# Patient Record
Sex: MALE | Race: AMERICAN INDIAN OR ALASKA NATIVE | NOT HISPANIC OR LATINO
[De-identification: names, ages, dates, MRNs, and addresses within clinical notes are randomized per-mention and may not be internally consistent; named-entity substitution may affect disease eponyms.]

---

## 2018-10-18 ENCOUNTER — TRANSCRIPTION ENCOUNTER (OUTPATIENT)
Age: 78
End: 2018-10-18

## 2018-10-18 ENCOUNTER — OUTPATIENT (OUTPATIENT)
Dept: OUTPATIENT SERVICES | Facility: HOSPITAL | Age: 78
LOS: 1 days | Discharge: HOME | End: 2018-10-18
Payer: MEDICARE

## 2018-10-18 PROBLEM — Z00.00 ENCOUNTER FOR PREVENTIVE HEALTH EXAMINATION: Status: ACTIVE | Noted: 2018-10-18

## 2018-10-19 ENCOUNTER — INPATIENT (INPATIENT)
Facility: HOSPITAL | Age: 78
LOS: 5 days | Discharge: HOME | DRG: 841 | End: 2018-10-25
Attending: HOSPITALIST | Admitting: INTERNAL MEDICINE
Payer: COMMERCIAL

## 2018-10-19 VITALS
HEART RATE: 76 BPM | TEMPERATURE: 98 F | RESPIRATION RATE: 17 BRPM | DIASTOLIC BLOOD PRESSURE: 64 MMHG | OXYGEN SATURATION: 97 % | SYSTOLIC BLOOD PRESSURE: 110 MMHG

## 2018-10-19 DIAGNOSIS — E87.6 HYPOKALEMIA: ICD-10-CM

## 2018-10-19 LAB
ALBUMIN SERPL ELPH-MCNC: 2.8 G/DL — LOW (ref 3.3–5)
ALP SERPL-CCNC: 73 U/L — SIGNIFICANT CHANGE UP (ref 40–120)
ALT FLD-CCNC: 14 U/L — SIGNIFICANT CHANGE UP (ref 10–45)
ANION GAP SERPL CALC-SCNC: 14 MMOL/L — SIGNIFICANT CHANGE UP (ref 5–17)
APPEARANCE UR: ABNORMAL
AST SERPL-CCNC: 18 U/L — SIGNIFICANT CHANGE UP (ref 10–40)
BACTERIA # UR AUTO: NEGATIVE — SIGNIFICANT CHANGE UP
BASOPHILS # BLD AUTO: 0 K/UL — SIGNIFICANT CHANGE UP (ref 0–0.2)
BILIRUB SERPL-MCNC: 0.4 MG/DL — SIGNIFICANT CHANGE UP (ref 0.2–1.2)
BILIRUB UR-MCNC: NEGATIVE — SIGNIFICANT CHANGE UP
BUN SERPL-MCNC: 15 MG/DL — SIGNIFICANT CHANGE UP (ref 7–23)
CALCIUM SERPL-MCNC: 8.1 MG/DL — LOW (ref 8.4–10.5)
CHLORIDE SERPL-SCNC: 90 MMOL/L — LOW (ref 96–108)
CO2 SERPL-SCNC: 31 MMOL/L — SIGNIFICANT CHANGE UP (ref 22–31)
COLOR SPEC: YELLOW — SIGNIFICANT CHANGE UP
CREAT SERPL-MCNC: 1.6 MG/DL — HIGH (ref 0.5–1.3)
DIFF PNL FLD: NEGATIVE — SIGNIFICANT CHANGE UP
EOSINOPHIL # BLD AUTO: 0 K/UL — SIGNIFICANT CHANGE UP (ref 0–0.5)
EOSINOPHIL NFR BLD AUTO: 1 % — SIGNIFICANT CHANGE UP (ref 0–6)
EPI CELLS # UR: 4 /HPF — SIGNIFICANT CHANGE UP
GAS PNL BLDV: SIGNIFICANT CHANGE UP
GLUCOSE SERPL-MCNC: 127 MG/DL — HIGH (ref 70–99)
GLUCOSE UR QL: NEGATIVE — SIGNIFICANT CHANGE UP
GRAN CASTS # UR COMP ASSIST: 1 — SIGNIFICANT CHANGE UP
HCT VFR BLD CALC: 24.6 % — LOW (ref 39–50)
HGB BLD-MCNC: 8.1 G/DL — LOW (ref 13–17)
HYALINE CASTS # UR AUTO: 16 /LPF — HIGH (ref 0–2)
KETONES UR-MCNC: ABNORMAL
LEUKOCYTE ESTERASE UR-ACNC: NEGATIVE — SIGNIFICANT CHANGE UP
LYMPHOCYTES # BLD AUTO: 0.7 K/UL — LOW (ref 1–3.3)
LYMPHOCYTES # BLD AUTO: 14 % — SIGNIFICANT CHANGE UP (ref 13–44)
MAGNESIUM SERPL-MCNC: 1.7 MG/DL — SIGNIFICANT CHANGE UP (ref 1.6–2.6)
MCHC RBC-ENTMCNC: 22.5 PG — LOW (ref 27–34)
MCHC RBC-ENTMCNC: 33 GM/DL — SIGNIFICANT CHANGE UP (ref 32–36)
MCV RBC AUTO: 68 FL — LOW (ref 80–100)
MONOCYTES # BLD AUTO: 0.5 K/UL — SIGNIFICANT CHANGE UP (ref 0–0.9)
MONOCYTES NFR BLD AUTO: 4 % — SIGNIFICANT CHANGE UP (ref 2–14)
NEUTROPHILS # BLD AUTO: 6.2 K/UL — SIGNIFICANT CHANGE UP (ref 1.8–7.4)
NEUTROPHILS NFR BLD AUTO: 76 % — SIGNIFICANT CHANGE UP (ref 43–77)
NITRITE UR-MCNC: NEGATIVE — SIGNIFICANT CHANGE UP
PH UR: 6.5 — SIGNIFICANT CHANGE UP (ref 5–8)
PLATELET # BLD AUTO: 132 K/UL — LOW (ref 150–400)
POTASSIUM SERPL-MCNC: 2.6 MMOL/L — CRITICAL LOW (ref 3.5–5.3)
POTASSIUM SERPL-SCNC: 2.6 MMOL/L — CRITICAL LOW (ref 3.5–5.3)
PROT SERPL-MCNC: 5.9 G/DL — LOW (ref 6–8.3)
PROT UR-MCNC: ABNORMAL
RBC # BLD: 3.61 M/UL — LOW (ref 4.2–5.8)
RBC # FLD: 13.5 % — SIGNIFICANT CHANGE UP (ref 10.3–14.5)
RBC CASTS # UR COMP ASSIST: 1 /HPF — SIGNIFICANT CHANGE UP (ref 0–4)
SODIUM SERPL-SCNC: 135 MMOL/L — SIGNIFICANT CHANGE UP (ref 135–145)
SP GR SPEC: 1.02 — SIGNIFICANT CHANGE UP (ref 1.01–1.02)
UROBILINOGEN FLD QL: NEGATIVE — SIGNIFICANT CHANGE UP
WBC # BLD: 7.3 K/UL — SIGNIFICANT CHANGE UP (ref 3.8–10.5)
WBC # FLD AUTO: 7.3 K/UL — SIGNIFICANT CHANGE UP (ref 3.8–10.5)
WBC UR QL: 8 /HPF — HIGH (ref 0–5)

## 2018-10-19 PROCEDURE — 99285 EMERGENCY DEPT VISIT HI MDM: CPT

## 2018-10-19 PROCEDURE — 70450 CT HEAD/BRAIN W/O DYE: CPT | Mod: 26

## 2018-10-19 RX ORDER — POTASSIUM CHLORIDE 20 MEQ
10 PACKET (EA) ORAL ONCE
Qty: 0 | Refills: 0 | Status: COMPLETED | OUTPATIENT
Start: 2018-10-19 | End: 2018-10-19

## 2018-10-19 RX ORDER — POTASSIUM CHLORIDE 20 MEQ
40 PACKET (EA) ORAL ONCE
Qty: 0 | Refills: 0 | Status: COMPLETED | OUTPATIENT
Start: 2018-10-19 | End: 2018-10-19

## 2018-10-19 RX ORDER — SODIUM CHLORIDE 9 MG/ML
1000 INJECTION INTRAMUSCULAR; INTRAVENOUS; SUBCUTANEOUS ONCE
Qty: 0 | Refills: 0 | Status: COMPLETED | OUTPATIENT
Start: 2018-10-19 | End: 2018-10-19

## 2018-10-19 RX ADMIN — Medication 100 MILLIEQUIVALENT(S): at 21:37

## 2018-10-19 RX ADMIN — Medication 40 MILLIEQUIVALENT(S): at 21:30

## 2018-10-19 RX ADMIN — SODIUM CHLORIDE 1000 MILLILITER(S): 9 INJECTION INTRAMUSCULAR; INTRAVENOUS; SUBCUTANEOUS at 20:06

## 2018-10-19 RX ADMIN — SODIUM CHLORIDE 1000 MILLILITER(S): 9 INJECTION INTRAMUSCULAR; INTRAVENOUS; SUBCUTANEOUS at 21:30

## 2018-10-19 NOTE — ED PROVIDER NOTE - CHIEF COMPLAINT
The patient is a 78y Male complaining of The patient is a 78y Male complaining of cough and weakness

## 2018-10-19 NOTE — ED PROVIDER NOTE - PHYSICAL EXAMINATION
General: NAD, good hygiene, well developed  HENT: Atraumatic, PERRLA, no conjunctivae injection, no post. oropharynx erythema, exudates  Cardiovascular: RRR, S1&2, no murmurs, rubs, radial pulses equal and b/l  Respiratory: CTABL, no wheezes or crackles, no decreased breath sounds  Abdominal:  soft and mild tenderness on deep palpitation, no rigidity, rebound, or distension   Extremities: no edema of the legs/feet  Skin: warm, well perfused  Neurologic: nonfocal, AAOx3  Psych: normal mood and affect

## 2018-10-19 NOTE — ED PROVIDER NOTE - NS ED ROS FT
HENT: denies nasal congestion, ear pain, hearing loss  Eyes: denies visual changes  Neck: denies neck pain, swelling, stiffness  CV: denies chest pain, palpitations  Resp: HPI  GI: denies nausea, vomiting, diarrhea, abdominal pain, constipation  Urinary: denies pain on urination change  MSK: denies joint and muscle pain  Neuro: denies headaches, lightheadedness  Skin: denies rashes

## 2018-10-19 NOTE — ED PROVIDER NOTE - ATTENDING CONTRIBUTION TO CARE
I performed a history and physical exam of the patient and discussed their management with the resident and /or advanced care provider. I reviewed the resident and /or ACP's note and agree with the documented findings and plan of care. My medical decision making and observations are found above.  lungs clear, abd soft

## 2018-10-19 NOTE — ED PROVIDER NOTE - MEDICAL DECISION MAKING DETAILS
Maximiliano: multiple falls and weakness 1 week after chemo.  Will ensure not neutropenic, hydrate, look for infection. likely admit because of disease. Maximiliano: multiple falls and weakness 1 week after chemo.  Will ensure not neutropenic, hydrate, look for infection. likely admit because of disease. multiple falls make for possible traumatic damage

## 2018-10-19 NOTE — ED ADULT NURSE NOTE - NSIMPLEMENTINTERV_GEN_ALL_ED
Implemented All Fall Risk Interventions:  [place] to call system. Call bell, personal items and telephone within reach. Instruct patient to call for assistance. Room bathroom lighting operational. Non-slip footwear when patient is off stretcher. Physically safe environment: no spills, clutter or unnecessary equipment. Stretcher in lowest position, wheels locked, appropriate side rails in place. Provide visual cue, wrist band, yellow gown, etc. Monitor gait and stability. Monitor for mental status changes and reorient to person, place, and time. Review medications for side effects contributing to fall risk. Reinforce activity limits and safety measures with patient and family.

## 2018-10-19 NOTE — ED ADULT NURSE NOTE - OBJECTIVE STATEMENT
78 y m came to the ed with lethargy for the last week. patient is on chemotherapy. patient is a/ox3 although family states he is not acting like his normal self. patient fell today but denies hitting his head, denies loc. c/o nausea and decreased po intake due to nausea. denies fevers, chills, chest pain, sob. abdomen is soft and nontender. skin is warm and dry.

## 2018-10-19 NOTE — ED PROVIDER NOTE - PROGRESS NOTE DETAILS
patient has severe hypokalemia with symptoms will admit for hypokalemia and give IVF and KCl in ed. Patient VSS. Patient admitted prior to my arrival. MAR requesting telemetry and EKG. Admission order modified. EDWARD. patient is on Isoniazid but unaware of the reason, stating its prescribed by the PCP.

## 2018-10-19 NOTE — ED PROVIDER NOTE - OBJECTIVE STATEMENT
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 Pt has decreased po intake, appetite, and had 2x episodes of  fall due to weakness since last wk. No LOC, or lightheadedness. Pt's fm also endorses patient having questionable AMS that last for minutes where patient will "zoom" out. Family also report increasing belching. No seizure, incontinence, prodrome, postictal, or tongue biting. Increased urine freq but denied dysuria or hematuria.

## 2018-10-20 DIAGNOSIS — R94.31 ABNORMAL ELECTROCARDIOGRAM [ECG] [EKG]: ICD-10-CM

## 2018-10-20 DIAGNOSIS — Z29.9 ENCOUNTER FOR PROPHYLACTIC MEASURES, UNSPECIFIED: ICD-10-CM

## 2018-10-20 DIAGNOSIS — E87.6 HYPOKALEMIA: ICD-10-CM

## 2018-10-20 DIAGNOSIS — R05 COUGH: ICD-10-CM

## 2018-10-20 DIAGNOSIS — R11.2 NAUSEA WITH VOMITING, UNSPECIFIED: ICD-10-CM

## 2018-10-20 DIAGNOSIS — R60.0 LOCALIZED EDEMA: ICD-10-CM

## 2018-10-20 DIAGNOSIS — N17.9 ACUTE KIDNEY FAILURE, UNSPECIFIED: ICD-10-CM

## 2018-10-20 DIAGNOSIS — D62 ACUTE POSTHEMORRHAGIC ANEMIA: ICD-10-CM

## 2018-10-20 DIAGNOSIS — R09.89 OTHER SPECIFIED SYMPTOMS AND SIGNS INVOLVING THE CIRCULATORY AND RESPIRATORY SYSTEMS: ICD-10-CM

## 2018-10-20 DIAGNOSIS — K92.1 MELENA: ICD-10-CM

## 2018-10-20 DIAGNOSIS — A15.9 RESPIRATORY TUBERCULOSIS UNSPECIFIED: ICD-10-CM

## 2018-10-20 LAB
ANION GAP SERPL CALC-SCNC: 13 MMOL/L — SIGNIFICANT CHANGE UP (ref 5–17)
ANION GAP SERPL CALC-SCNC: 13 MMOL/L — SIGNIFICANT CHANGE UP (ref 5–17)
BASOPHILS # BLD AUTO: 0 K/UL — SIGNIFICANT CHANGE UP (ref 0–0.2)
BLD GP AB SCN SERPL QL: NEGATIVE — SIGNIFICANT CHANGE UP
BUN SERPL-MCNC: 11 MG/DL — SIGNIFICANT CHANGE UP (ref 7–23)
BUN SERPL-MCNC: 8 MG/DL — SIGNIFICANT CHANGE UP (ref 7–23)
CA-I BLD-SCNC: 1.01 MMOL/L — LOW (ref 1.12–1.3)
CALCIUM SERPL-MCNC: 7.2 MG/DL — LOW (ref 8.4–10.5)
CALCIUM SERPL-MCNC: 7.4 MG/DL — LOW (ref 8.4–10.5)
CHLORIDE SERPL-SCNC: 100 MMOL/L — SIGNIFICANT CHANGE UP (ref 96–108)
CHLORIDE SERPL-SCNC: 99 MMOL/L — SIGNIFICANT CHANGE UP (ref 96–108)
CO2 SERPL-SCNC: 28 MMOL/L — SIGNIFICANT CHANGE UP (ref 22–31)
CO2 SERPL-SCNC: 29 MMOL/L — SIGNIFICANT CHANGE UP (ref 22–31)
CREAT SERPL-MCNC: 0.88 MG/DL — SIGNIFICANT CHANGE UP (ref 0.5–1.3)
CREAT SERPL-MCNC: 1.03 MG/DL — SIGNIFICANT CHANGE UP (ref 0.5–1.3)
EOSINOPHIL # BLD AUTO: 0 K/UL — SIGNIFICANT CHANGE UP (ref 0–0.5)
GLUCOSE SERPL-MCNC: 109 MG/DL — HIGH (ref 70–99)
GLUCOSE SERPL-MCNC: 112 MG/DL — HIGH (ref 70–99)
HCT VFR BLD CALC: 21.6 % — LOW (ref 39–50)
HCT VFR BLD CALC: 23 % — LOW (ref 39–50)
HGB BLD-MCNC: 7.2 G/DL — LOW (ref 13–17)
HGB BLD-MCNC: 7.4 G/DL — LOW (ref 13–17)
LYMPHOCYTES # BLD AUTO: 0.6 K/UL — LOW (ref 1–3.3)
LYMPHOCYTES # BLD AUTO: 12 % — LOW (ref 13–44)
MAGNESIUM SERPL-MCNC: 1.6 MG/DL — SIGNIFICANT CHANGE UP (ref 1.6–2.6)
MAGNESIUM SERPL-MCNC: 1.8 MG/DL — SIGNIFICANT CHANGE UP (ref 1.6–2.6)
MCHC RBC-ENTMCNC: 21.9 PG — LOW (ref 27–34)
MCHC RBC-ENTMCNC: 22.5 PG — LOW (ref 27–34)
MCHC RBC-ENTMCNC: 32.2 GM/DL — SIGNIFICANT CHANGE UP (ref 32–36)
MCHC RBC-ENTMCNC: 33.1 GM/DL — SIGNIFICANT CHANGE UP (ref 32–36)
MCV RBC AUTO: 68 FL — LOW (ref 80–100)
MCV RBC AUTO: 68.1 FL — LOW (ref 80–100)
MONOCYTES # BLD AUTO: 0.5 K/UL — SIGNIFICANT CHANGE UP (ref 0–0.9)
MONOCYTES NFR BLD AUTO: 4 % — SIGNIFICANT CHANGE UP (ref 2–14)
NEUTROPHILS # BLD AUTO: 5.6 K/UL — SIGNIFICANT CHANGE UP (ref 1.8–7.4)
NEUTROPHILS NFR BLD AUTO: 82 % — HIGH (ref 43–77)
PHOSPHATE SERPL-MCNC: 1.5 MG/DL — LOW (ref 2.5–4.5)
PLATELET # BLD AUTO: 140 K/UL — LOW (ref 150–400)
PLATELET # BLD AUTO: 142 K/UL — LOW (ref 150–400)
POTASSIUM SERPL-MCNC: 2.9 MMOL/L — CRITICAL LOW (ref 3.5–5.3)
POTASSIUM SERPL-MCNC: 3 MMOL/L — LOW (ref 3.5–5.3)
POTASSIUM SERPL-SCNC: 2.9 MMOL/L — CRITICAL LOW (ref 3.5–5.3)
POTASSIUM SERPL-SCNC: 3 MMOL/L — LOW (ref 3.5–5.3)
RAPID RVP RESULT: SIGNIFICANT CHANGE UP
RBC # BLD: 3.17 M/UL — LOW (ref 4.2–5.8)
RBC # BLD: 3.37 M/UL — LOW (ref 4.2–5.8)
RBC # FLD: 13.8 % — SIGNIFICANT CHANGE UP (ref 10.3–14.5)
RBC # FLD: 14 % — SIGNIFICANT CHANGE UP (ref 10.3–14.5)
RH IG SCN BLD-IMP: POSITIVE — SIGNIFICANT CHANGE UP
SODIUM SERPL-SCNC: 140 MMOL/L — SIGNIFICANT CHANGE UP (ref 135–145)
SODIUM SERPL-SCNC: 142 MMOL/L — SIGNIFICANT CHANGE UP (ref 135–145)
WBC # BLD: 6.6 K/UL — SIGNIFICANT CHANGE UP (ref 3.8–10.5)
WBC # BLD: 6.8 K/UL — SIGNIFICANT CHANGE UP (ref 3.8–10.5)
WBC # FLD AUTO: 6.6 K/UL — SIGNIFICANT CHANGE UP (ref 3.8–10.5)
WBC # FLD AUTO: 6.8 K/UL — SIGNIFICANT CHANGE UP (ref 3.8–10.5)

## 2018-10-20 PROCEDURE — 99222 1ST HOSP IP/OBS MODERATE 55: CPT

## 2018-10-20 PROCEDURE — 99223 1ST HOSP IP/OBS HIGH 75: CPT

## 2018-10-20 PROCEDURE — 74176 CT ABD & PELVIS W/O CONTRAST: CPT | Mod: 26

## 2018-10-20 PROCEDURE — 12345: CPT | Mod: NC

## 2018-10-20 PROCEDURE — 71250 CT THORAX DX C-: CPT | Mod: 26

## 2018-10-20 RX ORDER — ONDANSETRON 8 MG/1
8 TABLET, FILM COATED ORAL EVERY 6 HOURS
Qty: 0 | Refills: 0 | Status: DISCONTINUED | OUTPATIENT
Start: 2018-10-20 | End: 2018-10-20

## 2018-10-20 RX ORDER — POTASSIUM PHOSPHATE, MONOBASIC POTASSIUM PHOSPHATE, DIBASIC 236; 224 MG/ML; MG/ML
15 INJECTION, SOLUTION INTRAVENOUS ONCE
Qty: 0 | Refills: 0 | Status: COMPLETED | OUTPATIENT
Start: 2018-10-20 | End: 2018-10-20

## 2018-10-20 RX ORDER — INFLUENZA VIRUS VACCINE 15; 15; 15; 15 UG/.5ML; UG/.5ML; UG/.5ML; UG/.5ML
0.5 SUSPENSION INTRAMUSCULAR ONCE
Qty: 0 | Refills: 0 | Status: COMPLETED | OUTPATIENT
Start: 2018-10-20 | End: 2018-10-25

## 2018-10-20 RX ORDER — POTASSIUM CHLORIDE 20 MEQ
40 PACKET (EA) ORAL EVERY 4 HOURS
Qty: 0 | Refills: 0 | Status: COMPLETED | OUTPATIENT
Start: 2018-10-20 | End: 2018-10-20

## 2018-10-20 RX ORDER — PANTOPRAZOLE SODIUM 20 MG/1
40 TABLET, DELAYED RELEASE ORAL
Qty: 0 | Refills: 0 | Status: DISCONTINUED | OUTPATIENT
Start: 2018-10-20 | End: 2018-10-25

## 2018-10-20 RX ORDER — SODIUM CHLORIDE 9 MG/ML
1000 INJECTION INTRAMUSCULAR; INTRAVENOUS; SUBCUTANEOUS
Qty: 0 | Refills: 0 | Status: DISCONTINUED | OUTPATIENT
Start: 2018-10-20 | End: 2018-10-25

## 2018-10-20 RX ORDER — POTASSIUM CHLORIDE 20 MEQ
10 PACKET (EA) ORAL
Qty: 0 | Refills: 0 | Status: COMPLETED | OUTPATIENT
Start: 2018-10-20 | End: 2018-10-20

## 2018-10-20 RX ORDER — MAGNESIUM SULFATE 500 MG/ML
1 VIAL (ML) INJECTION ONCE
Qty: 0 | Refills: 0 | Status: COMPLETED | OUTPATIENT
Start: 2018-10-20 | End: 2018-10-20

## 2018-10-20 RX ADMIN — Medication 100 MILLIGRAM(S): at 03:54

## 2018-10-20 RX ADMIN — Medication 40 MILLIEQUIVALENT(S): at 21:53

## 2018-10-20 RX ADMIN — Medication 100 MILLIGRAM(S): at 21:53

## 2018-10-20 RX ADMIN — PANTOPRAZOLE SODIUM 40 MILLIGRAM(S): 20 TABLET, DELAYED RELEASE ORAL at 05:56

## 2018-10-20 RX ADMIN — SODIUM CHLORIDE 50 MILLILITER(S): 9 INJECTION INTRAMUSCULAR; INTRAVENOUS; SUBCUTANEOUS at 16:31

## 2018-10-20 RX ADMIN — Medication 100 GRAM(S): at 03:54

## 2018-10-20 RX ADMIN — Medication 100 MILLIEQUIVALENT(S): at 08:42

## 2018-10-20 RX ADMIN — Medication 40 MILLIEQUIVALENT(S): at 17:17

## 2018-10-20 RX ADMIN — Medication 100 MILLIEQUIVALENT(S): at 16:31

## 2018-10-20 RX ADMIN — Medication 100 MILLIGRAM(S): at 14:03

## 2018-10-20 RX ADMIN — PANTOPRAZOLE SODIUM 40 MILLIGRAM(S): 20 TABLET, DELAYED RELEASE ORAL at 17:17

## 2018-10-20 RX ADMIN — Medication 100 MILLIEQUIVALENT(S): at 17:52

## 2018-10-20 RX ADMIN — Medication 100 MILLIEQUIVALENT(S): at 20:39

## 2018-10-20 RX ADMIN — POTASSIUM PHOSPHATE, MONOBASIC POTASSIUM PHOSPHATE, DIBASIC 62.5 MILLIMOLE(S): 236; 224 INJECTION, SOLUTION INTRAVENOUS at 09:49

## 2018-10-20 RX ADMIN — Medication 100 MILLIEQUIVALENT(S): at 05:57

## 2018-10-20 RX ADMIN — SODIUM CHLORIDE 50 MILLILITER(S): 9 INJECTION INTRAMUSCULAR; INTRAVENOUS; SUBCUTANEOUS at 23:33

## 2018-10-20 RX ADMIN — Medication 100 MILLIEQUIVALENT(S): at 07:16

## 2018-10-20 NOTE — PROGRESS NOTE ADULT - SUBJECTIVE AND OBJECTIVE BOX
David Ward MD  Division of Hospital Medicine  Pager [phone]      ORLIN BROWN  78y  Male      Patient is a 78y old  Male who presents with a chief complaint of Severe hypokalemia/Prolonged QTc interval/GI bleed w/melena/Nausea/vomiting/diarrhea/coughing/weakness and fall with head strike (20 Oct 2018 09:37)      INTERVAL HPI/OVERNIGHT EVENTS:  Still complaining of cough. Denies SOB, palpitations, fever chills.       REVIEW OF SYSTEMS: 14 point ROS negative unless listed above    T(C): 37.1 (10-20-18 @ 07:36), Max: 37.1 (10-20-18 @ 07:36)  HR: 84 (10-20-18 @ 07:36) (73 - 84)  BP: 129/67 (10-20-18 @ 07:36) (110/64 - 135/66)  RR: 18 (10-20-18 @ 07:36) (17 - 20)  SpO2: 94% (10-20-18 @ 07:36) (94% - 100%)  Wt(kg): --Vital Signs Last 24 Hrs  T(C): 37.1 (20 Oct 2018 07:36), Max: 37.1 (20 Oct 2018 07:36)  T(F): 98.8 (20 Oct 2018 07:36), Max: 98.8 (20 Oct 2018 07:36)  HR: 84 (20 Oct 2018 07:36) (73 - 84)  BP: 129/67 (20 Oct 2018 07:36) (110/64 - 135/66)  BP(mean): --  RR: 18 (20 Oct 2018 07:36) (17 - 20)  SpO2: 94% (20 Oct 2018 07:36) (94% - 100%)    PHYSICAL EXAM:  GENERAL: NAD, well-groomed, well-developed  ENMT: No tonsillar erythema, exudates,; Moist mucous membranes. No lesions  NECK: Supple, No JVD  CHEST/LUNG: Clear to percussion bilaterally; No rales, rhonchi, wheezing, or rubs  HEART: Regular rate and rhythm; No murmurs, rubs, or gallops  ABDOMEN: Soft, Nontender, Nondistended; Bowel sounds present.   EXTREMITIES:  2+ Peripheral Pulses, No clubbing, cyanosis, or edema  SKIN: No rashes or lesions  PSYCH: Alert & Oriented x3    Consultant(s) Notes Reviewed:  [x ] YES  [ ] NO  Care Discussed with Consultants/Other Providers [ x] YES  [ ] NO    LABS:                        7.4    6.8   )-----------( 140      ( 20 Oct 2018 04:11 )             23.0     10-20    140  |  99  |  11  ----------------------------<  112<H>  2.9<LL>   |  28  |  1.03    Ca    7.2<L>      20 Oct 2018 04:11  Phos  1.5     10-20  Mg     1.6     10-20    TPro  5.9<L>  /  Alb  2.8<L>  /  TBili  0.4  /  DBili  x   /  AST  18  /  ALT  14  /  AlkPhos  73  10-      Urinalysis Basic - ( 19 Oct 2018 19:44 )    Color: Yellow / Appearance: Slightly Turbid / S.016 / pH: x  Gluc: x / Ketone: Small  / Bili: Negative / Urobili: Negative   Blood: x / Protein: 30 mg/dL / Nitrite: Negative   Leuk Esterase: Negative / RBC: 1 /hpf / WBC 8 /hpf   Sq Epi: x / Non Sq Epi: 4 /hpf / Bacteria: Negative      CAPILLARY BLOOD GLUCOSE            Urinalysis Basic - ( 19 Oct 2018 19:44 )    Color: Yellow / Appearance: Slightly Turbid / S.016 / pH: x  Gluc: x / Ketone: Small  / Bili: Negative / Urobili: Negative   Blood: x / Protein: 30 mg/dL / Nitrite: Negative   Leuk Esterase: Negative / RBC: 1 /hpf / WBC 8 /hpf   Sq Epi: x / Non Sq Epi: 4 /hpf / Bacteria: Negative        RADIOLOGY & ADDITIONAL TESTS:    Imaging Personally Reviewed:  [x ] YES  [ ] NO

## 2018-10-20 NOTE — H&P ADULT - PROBLEM SELECTOR PLAN 2
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  Type and screen stat  CBC stat  Transfuse PRN

## 2018-10-20 NOTE — H&P ADULT - HISTORY OF PRESENT ILLNESS
78M hx of gastric lymphoma on chemo, last chemotherapy sessions 10/9, HTN, gout, latent TB on INH, now presenting s/p 2 falls yesterday with progressive weakness over the last week. Patient has had progressive weakness after recent chemotherapy. However, has also had severe persistent coughing that is nonproductive x 10 days. No fevers but some chills at home. Patient also noticed that he has now been having dark melanotic stools including some episodes of watery tar-black diarrhea including 2 episodes today. Is also very nauseous and vomiting up food he takes. Because of nausea he has had very poor po intake. Patient reports feeling very weak and also felt lightheaded after vomiting and then had a fall. He then had another episode of fall while walking in the bathroom. On second episode patient struck his head and was noted by his daughter to be mildly confused afterwards but this rapidly resolved. Patient did not lose consciousness with fall however. No chest pain. + lower extremity edema x 1 day with L>R, has not happened before. No calf tenderness or pain. No pleuritic chest pain.

## 2018-10-20 NOTE — H&P ADULT - PROBLEM SELECTOR PLAN 3
Pt with SUJATHA likely 2/2 dehydration from recurrent vomiting and diarrhea  s/p IVF resuscitation  Trend Creatinine on BMP  IVF support while temporarily NPO pending repeat CBC and CT

## 2018-10-20 NOTE — H&P ADULT - PROBLEM SELECTOR PLAN 10
Holding off on SCDs or lovenox pending evaluation for DVT.  Cannot given lovenox given acute blood loss anemia 2/2 melena from upper GI bleed

## 2018-10-20 NOTE — H&P ADULT - PROBLEM SELECTOR PLAN 1
Pt with GI bleed with multiple episodes of melanotic diarrhea now with acute blood loss anemia as well as severe weakness causing fall x 2 episodes.  Protonix 40mg IV stat then q12h  CT A/P with oral contrast given other symptoms  GI consult in AM  Type and screen stat  Repeat CBC stat

## 2018-10-20 NOTE — H&P ADULT - REASON FOR ADMISSION
Severe hypokalemia/Prolonged QTc interval/GI bleed w/melena/Nausea/vomiting/diarrhea/coughing/weakness and fall with head strike

## 2018-10-20 NOTE — CONSULT NOTE ADULT - ASSESSMENT
Impression:  77yo M with gastric lymphoma on chemo presents with symptomatic anemia and possible melena.    Problems:  1) Melena, symptomatic microcytic anemia - could be tumor bleeding given history of gastric lymphoma vs pud vs angioectasia  2) gastric lymphoma on chemo  3) n/v - could be 2/2 lymphoma vs chemo induced  4) Electrolyte disturbances    Recs:  - PPI drip  - trend CBC and transfuse to Hgb >7  - tentative plan for EGD on Monday, however limited therapeutic options if bleed from tumor  - symptomatic management of n/v per primary team  - replete lytes (K/phos/ca)  - check pre-transfusion iron studies/ferritin/b12/folate  - would check stool studies as well given immunosuppression - c diff, GI PCR, stool cx Impression:  79yo M with gastric lymphoma on chemo presents with symptomatic anemia and possible melena.    Problems:  1) Possible melena, symptomatic microcytic anemia - could be tumor bleeding given history of gastric lymphoma vs pud vs angioectasia vs esophagitis/ MWT from retching  2) gastric lymphoma on chemo, first round 10/9  3) n/v - could be 2/2 lymphoma vs chemo induced  4) Electrolyte disturbances    Recs:  - initiate PPI drip  - trend CBC and transfuse to Hgb >7  - tentative plan for EGD on Monday, however limited therapeutic options if bleed from tumor  - please obtain outpatient records from heme/onc as well as obtain EGD and colonoscopy report  - symptomatic management of n/v per primary team  - replete lytes (K/phos/ca)  - check pre-transfusion iron studies/ferritin/b12/folate  - would check stool studies as well given immunosuppression and reports of watery stools- c diff, GI PCR, stool cx Impression:  79yo M with gastric lymphoma on chemo presents with symptomatic anemia and possible melena.    Problems:  1) Possible melena, symptomatic microcytic anemia - could be tumor bleeding given history of gastric lymphoma vs pud vs angioectasia vs esophagitis/ MWT from retching  2) gastric lymphoma on chemo, first round 10/9  3) n/v - could be 2/2 lymphoma vs chemo induced  4) Electrolyte disturbances    Recs:  - would perform full infectious workup, including TB evaluation, stool studies (GI PCR, c diff, stool culture), blood cx, CXR, UA - patient is coughing and immunosuppressed from chemo and has history of latent TB  - PPI BID is fine, unclear if patient is bleeding or if this is response to chemotherapy  - trend CBC and transfuse to Hgb >7  - will monitor over weekend and determine if there is need for endoscopy this admission  - please obtain outpatient records from heme/onc as well as obtain EGD and colonoscopy report  - symptomatic management of n/v per primary team  - replete lytes (K/phos/ca)  - check pre-transfusion iron studies/ferritin/b12/folate

## 2018-10-20 NOTE — CONSULT NOTE ADULT - SUBJECTIVE AND OBJECTIVE BOX
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 Patient has had progressive weakness after recent chemotherapy.  Patient reported dark stools (possibly tarry) for the last week.  Is also very nauseous and vomiting up food he takes. Because of nausea he has had very poor po intake. Patient reports feeling very weak and also felt lightheaded after vomiting and then had a fall and hit his head, prompting ER visit.    Allergies:  No Known Allergies      Home Medications:  see [facility] Medications:  benzonatate 100 milliGRAM(s) Oral every 8 hours  LORazepam   Injectable 0.5 milliGRAM(s) IV Push every 6 hours PRN  pantoprazole  Injectable 40 milliGRAM(s) IV Push two times a day  potassium phosphate IVPB 15 milliMole(s) IV Intermittent once      PMHX/PSHX:  Gout  TB (tuberculosis)  HTN (hypertension)  Gastric lymphoma  No significant past surgical history      Family history:  No pertinent family history in first degree relatives      Social History: no etoh/drugs/tob    ROS:     General:  +weakness, fatigue,   Eyes:  Good vision, no reported pain  ENT:  No sore throat, pain, runny nose, dysphagia  CV:  No pain, palpitations, hypo/hypertension  Resp:  No dyspnea, cough, tachypnea, wheezing  GI:  See HPI  :  No pain, bleeding, incontinence, nocturia  Muscle:  No pain, weakness  Neuro:  No weakness, tingling, memory problems  Psych:  No fatigue, insomnia, mood problems, depression  Endocrine:  No polyuria, polydipsia, cold/heat intolerance  Heme:  No petechiae, ecchymosis, easy bruisability  Skin:  No rash, edema      PHYSICAL EXAM:     GENERAL:  Appears stated age, well-groomed, well-nourished, no distress  HEENT:  NC/AT,  conjunctivae clear and pink,  no JVD  CHEST:  Full & symmetric excursion, no increased effort, breath sounds clear  HEART:  Regular rhythm, S1, S2, no murmur/rub/S3/S4, no abdominal bruit, no edema  ABDOMEN:  Soft, non-tender, non-distended, normoactive bowel sounds,  no masses ,  EXTREMITIES:  no cyanosis,clubbing or edema  SKIN:  No rash/erythema/ecchymoses/petechiae/wounds/abscess/warm/dry  NEURO:  Alert, oriented    Vital Signs:  Vital Signs Last 24 Hrs  T(C): 37.1 (20 Oct 2018 07:36), Max: 37.1 (20 Oct 2018 07:36)  T(F): 98.8 (20 Oct 2018 07:36), Max: 98.8 (20 Oct 2018 07:36)  HR: 84 (20 Oct 2018 07:36) (73 - 84)  BP: 129/67 (20 Oct 2018 07:36) (110/64 - 135/66)  BP(mean): --  RR: 18 (20 Oct 2018 07:36) (17 - 20)  SpO2: 94% (20 Oct 2018 07:36) (94% - 100%)  Daily     Daily     LABS:                        7.4    6.8   )-----------( 140      ( 20 Oct 2018 04:11 )             23.0     10-20    140  |  99  |  11  ----------------------------<  112<H>  2.9<LL>   |  28  |  1.03    Ca    7.2<L>      20 Oct 2018 04:11  Phos  1.5     10-20  Mg     1.6     10-20    TPro  5.9<L>  /  Alb  2.8<L>  /  TBili  0.4  /  DBili  x   /  AST  18  /  ALT  14  /  AlkPhos  73  10-19    LIVER FUNCTIONS - ( 19 Oct 2018 19:44 )  Alb: 2.8 g/dL / Pro: 5.9 g/dL / ALK PHOS: 73 U/L / ALT: 14 U/L / AST: 18 U/L / GGT: x             Urinalysis Basic - ( 19 Oct 2018 19:44 )    Color: Yellow / Appearance: Slightly Turbid / S.016 / pH: x  Gluc: x / Ketone: Small  / Bili: Negative / Urobili: Negative   Blood: x / Protein: 30 mg/dL / Nitrite: Negative   Leuk Esterase: Negative / RBC: 1 /hpf / WBC 8 /hpf   Sq Epi: x / Non Sq Epi: 4 /hpf / Bacteria: Negative          Imaging:  < from: CT Abdomen and Pelvis w/ Oral Cont (10.20.18 @ 05:06) >    EXAM:  CT ABDOMEN AND PELVIS OC                          EXAM:  CT CHEST                            PROCEDURE DATE:  10/20/2018            INTERPRETATION:  CLINICAL INFORMATION: History of chemotherapy for   gastric lymphoma with persistent nausea,GI bleed and persistent cough    COMPARISON: None.    PROCEDURE:   CT of the Chest, Abdomen and Pelvis was performed without intravenous   contrast.   Intravenous contrast: None.  Oral contrast: positive contrast was administered.  Sagittal and coronal reformats were performed.    FINDINGS:    CHEST:     LUNGS AND LARGE AIRWAYS: Patent central airways. No pulmonary nodules.   Small cystic area right upper lobe  PLEURA: No pleural effusion.  VESSELS: Within normal limits.  HEART: Heart size is normal. No pericardial effusion.  MEDIASTINUM AND FRANCESCO: No lymphadenopathy.  CHEST WALL AND LOWER NECK: Within normal limits.    ABDOMEN AND PELVIS:    LIVER: Within normal limits.  BILE DUCTS: Normal caliber.  GALLBLADDER: Within normal limits.  SPLEEN:Within normal limits.  PANCREAS: Within normal limits.  ADRENALS: Within normal limits.  KIDNEYS/URETERS: 4 cm right renal cyst. No hydronephrosis or calculi    BLADDER: Within normal limits.  REPRODUCTIVE ORGANS: Prostate is mildly enlarged measuring 4.0 x 4.9 cm    BOWEL: No definite gastric thickening to be seen. The remainder the bowel   is unremarkable         PERITONEUM: No ascites.  VESSELS:  Within normal limits.  RETROPERITONEUM: No lymphadenopathy.    ABDOMINAL WALL: Within normal limits.  BONES: Mild degenerative changes    IMPRESSION:     No acute findings identified.    No evidence of acute infiltrates.    Despite the history gastric lymphoma no definite focal gastric   abnormality can be seen nor is there definable adenopathy.                      LEIGH LOMAX M.D., ATTENDING RADIOLOGIST  This document has been electronically signed. Oct 20 2018  8:34AM                < end of copied text > Chief Complaint:  Patient is a 78y old  Male who presents with a chief complaint of Severe hypokalemia/Prolonged QTc interval/GI bleed w/melena/Nausea/vomiting/diarrhea/coughing/weakness and fall with head strike (20 Oct 2018 03:34)      HPI:  78M hx of gastric lymphoma (B-cell?), dx'd by outpatient GI doctor 1 month ago, now on chemo (treated under. Dr. Jaeger in [facility] in [place]), last chemotherapy sessions 10/9, HTN, gout, latent TB on INH, now presenting s/p 2 falls yesterday with progressive weakness over the last week. Patient has had progressive weakness after recent chemotherapy.  Patient reported dark stools (watery but possibly tarry) for the past two days but is unable to quantify how much or since when.  His daughter reports that he vomited 5 days ago and has been feeling nauseated, but no blood in emesis.  He fell and hit his head due to weakness, which prompted admission.    The patient's daughter reports that he had EGD and colonoscopy 1 month ago by outpatient GI, at which time the gastric lymphoma was diagnosed.  Per report, the colonoscopy was normal.      Allergies:  No Known Allergies      Home Medications:  see [facility] Medications:  benzonatate 100 milliGRAM(s) Oral every 8 hours  LORazepam   Injectable 0.5 milliGRAM(s) IV Push every 6 hours PRN  pantoprazole  Injectable 40 milliGRAM(s) IV Push two times a day  potassium phosphate IVPB 15 milliMole(s) IV Intermittent once      PMHX/PSHX:  Gout  TB (tuberculosis)  HTN (hypertension)  Gastric lymphoma  No significant past surgical history      Family history:  No pertinent family history in first degree relatives, including no gastric or other cancers       Social History: no etoh/drugs/tob.  Distant history of tobacco use in his 30s.    ROS:     General:  +weakness, fatigue,   Eyes:  Good vision, no reported pain  ENT:  No sore throat, pain, runny nose, dysphagia  CV:  No pain, palpitations, hypo/hypertension  Resp:  No dyspnea, cough, tachypnea, wheezing  GI:  See HPI  :  No pain, bleeding, incontinence, nocturia  Muscle:  No pain, weakness  Neuro:  No weakness, tingling, memory problems  Psych:  No fatigue, insomnia, mood problems, depression  Endocrine:  No polyuria, polydipsia, cold/heat intolerance  Heme:  No petechiae, ecchymosis, easy bruisability  Skin:  No rash, edema      PHYSICAL EXAM:     GENERAL:  NAD  HEENT:  NC/AT,  conjunctivae clear and pink,  no JVD  CHEST:  Full & symmetric excursion, no increased effort  HEART:  Regular rhythm  ABDOMEN:  Soft, non-tender, non-distended, normoactive bowel sounds  DEJON: deferred due to location in ER  EXTREMITIES:  no edema  SKIN:  No rash  NEURO:  Alert, oriented    Vital Signs:  Vital Signs Last 24 Hrs  T(C): 37.1 (20 Oct 2018 07:36), Max: 37.1 (20 Oct 2018 07:36)  T(F): 98.8 (20 Oct 2018 07:36), Max: 98.8 (20 Oct 2018 07:36)  HR: 84 (20 Oct 2018 07:36) (73 - 84)  BP: 129/67 (20 Oct 2018 07:36) (110/64 - 135/66)  BP(mean): --  RR: 18 (20 Oct 2018 07:36) (17 - 20)  SpO2: 94% (20 Oct 2018 07:36) (94% - 100%)  Daily     Daily     LABS:                        7.4    6.8   )-----------( 140      ( 20 Oct 2018 04:11 )             23.0     10-20    140  |  99  |  11  ----------------------------<  112<H>  2.9<LL>   |  28  |  1.03    Ca    7.2<L>      20 Oct 2018 04:11  Phos  1.5     10-20  Mg     1.6     10-20    TPro  5.9<L>  /  Alb  2.8<L>  /  TBili  0.4  /  DBili  x   /  AST  18  /  ALT  14  /  AlkPhos  73  10-19    LIVER FUNCTIONS - ( 19 Oct 2018 19:44 )  Alb: 2.8 g/dL / Pro: 5.9 g/dL / ALK PHOS: 73 U/L / ALT: 14 U/L / AST: 18 U/L / GGT: x             Urinalysis Basic - ( 19 Oct 2018 19:44 )    Color: Yellow / Appearance: Slightly Turbid / S.016 / pH: x  Gluc: x / Ketone: Small  / Bili: Negative / Urobili: Negative   Blood: x / Protein: 30 mg/dL / Nitrite: Negative   Leuk Esterase: Negative / RBC: 1 /hpf / WBC 8 /hpf   Sq Epi: x / Non Sq Epi: 4 /hpf / Bacteria: Negative          Imaging:  < from: CT Abdomen and Pelvis w/ Oral Cont (10.20.18 @ 05:06) >    EXAM:  CT ABDOMEN AND PELVIS OC                          EXAM:  CT CHEST                            PROCEDURE DATE:  10/20/2018            INTERPRETATION:  CLINICAL INFORMATION: History of chemotherapy for   gastric lymphoma with persistent nausea,GI bleed and persistent cough    COMPARISON: None.    PROCEDURE:   CT of the Chest, Abdomen and Pelvis was performed without intravenous   contrast.   Intravenous contrast: None.  Oral contrast: positive contrast was administered.  Sagittal and coronal reformats were performed.    FINDINGS:    CHEST:     LUNGS AND LARGE AIRWAYS: Patent central airways. No pulmonary nodules.   Small cystic area right upper lobe  PLEURA: No pleural effusion.  VESSELS: Within normal limits.  HEART: Heart size is normal. No pericardial effusion.  MEDIASTINUM AND FRANCESCO: No lymphadenopathy.  CHEST WALL AND LOWER NECK: Within normal limits.    ABDOMEN AND PELVIS:    LIVER: Within normal limits.  BILE DUCTS: Normal caliber.  GALLBLADDER: Within normal limits.  SPLEEN:Within normal limits.  PANCREAS: Within normal limits.  ADRENALS: Within normal limits.  KIDNEYS/URETERS: 4 cm right renal cyst. No hydronephrosis or calculi    BLADDER: Within normal limits.  REPRODUCTIVE ORGANS: Prostate is mildly enlarged measuring 4.0 x 4.9 cm    BOWEL: No definite gastric thickening to be seen. The remainder the bowel   is unremarkable         PERITONEUM: No ascites.  VESSELS:  Within normal limits.  RETROPERITONEUM: No lymphadenopathy.    ABDOMINAL WALL: Within normal limits.  BONES: Mild degenerative changes    IMPRESSION:     No acute findings identified.    No evidence of acute infiltrates.    Despite the history gastric lymphoma no definite focal gastric   abnormality can be seen nor is there definable adenopathy.                      LEIGH LOMAX M.D., ATTENDING RADIOLOGIST  This document has been electronically signed. Oct 20 2018  8:34AM                < end of copied text >

## 2018-10-20 NOTE — PROGRESS NOTE ADULT - PROBLEM SELECTOR PLAN 5
2/2 hypokalemia  Monitor on telemetry  Repeat EKG 2/2 hypokalemia. Corrected calcium 8.4  Monitor on telemetry  Repeat EKG

## 2018-10-20 NOTE — H&P ADULT - ASSESSMENT
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

## 2018-10-20 NOTE — PROGRESS NOTE ADULT - PROBLEM SELECTOR PLAN 2
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  Trend CBC  Transfuse to HGB >7 Likely 2/2 melena from likely upper GI bleed related to GALT on chemotherapy.  Trend CBC Q12  Transfuse to HGB >7  Check Iron studies

## 2018-10-20 NOTE — H&P ADULT - NSHPPHYSICALEXAM_GEN_ALL_CORE
Vital Signs Last 24 Hrs  T(C): 36.8 (20 Oct 2018 01:42), Max: 36.8 (19 Oct 2018 18:44)  T(F): 98.2 (20 Oct 2018 01:42), Max: 98.2 (19 Oct 2018 18:44)  HR: 73 (20 Oct 2018 01:42) (73 - 78)  BP: 129/58 (20 Oct 2018 01:42) (110/64 - 130/68)  BP(mean): --  RR: 19 (20 Oct 2018 01:42) (17 - 20)  SpO2: 100% (20 Oct 2018 01:42) (97% - 100%)    GENERAL: Mild distress from frequent coughing, well-developed  HEAD:  Atraumatic, Normocephalic  EYES: EOMI, PERRLA, conjunctiva and sclera clear  ENT: Oral mucosa moist  NECK: Neck supple  CHEST/LUNG: Clear to auscultation bilaterally; No wheeze, no rales, no rhonchi.    HEART: Regular rate and rhythm; No murmurs, rubs, or gallops  ABDOMEN: Soft, muscle spasms, no TTP, Nondistended; Bowel sounds present  EXTREMITIES:  No clubbing, cyanosis, or edema  VASCULAR: Posterior tibialis pulses intact bilaterally  PSYCH: Normal behavior, normal affect  NEUROLOGY: AAOx3  SKIN: grossly warm and dry

## 2018-10-20 NOTE — PROGRESS NOTE ADULT - PROBLEM SELECTOR PLAN 3
Pt with SUJATHA likely 2/2 dehydration from recurrent vomiting and diarrhea  s/p IVF resuscitation  Resolved  Trend BMP

## 2018-10-20 NOTE — CONSULT NOTE ADULT - ATTENDING COMMENTS
78M pmh gastric lymphoma s/p chemo (last dose 10/9 of R-CHOP), latent TB who presents with fatigue and found to have worse normocytic anemia.  Unclear report of 1 possible black stool, but brown stool on rectal and stable H&H since admit.  Has + cough with vague tachypnea on exam, otherwise benight.  Unclear if this anemia 2/2 GI loss - ddx also includes chemo induced, anemia of chronic disease.  Regardless no overt GIB at this time and can pursue conservative management while being worked up for potential infection.    Impression:  1) Anemia  2) ? dark stool x 1 ?  3) Gastric Lymphoma s/p R-CHOP on 10/9  4) Latent TB    Plan:  1) Infectious workup as per primary team  2) Please get OSH records including previous EGDs documenting newly dx Gastric Lymphoma  3) Trend CBC twice daily  4) Okay with clear liquid diet  5) IV PPI BID is reasonable  6) No role for EGD at this time  7) GI to follow

## 2018-10-20 NOTE — H&P ADULT - PROBLEM SELECTOR PLAN 9
Check b/l duplex US given L>R edema and cancer hx  May also be related to poor nutritional status and amlodipine use

## 2018-10-20 NOTE — H&P ADULT - PROBLEM SELECTOR PLAN 6
Unclear if 2/2 GI bleed vs GALT s/p chemo  Unable to give Zofran or Reglan due to significant QT prolongation  Start ativan IV PRN for nausea/vomiting for now  Replete K+ and should repeat EKG when K+ normalized to see if QT prolongation resolved and may be OK to start zofran

## 2018-10-20 NOTE — H&P ADULT - NSHPLABSRESULTS_GEN_ALL_CORE
Labs personally reviewed:                        8.1    7.3   )-----------( 132      ( 19 Oct 2018 19:44 )             24.6     10    135  |  90<L>  |  15  ----------------------------<  127<H>  2.6<LL>   |  31  |  1.60<H>    Ca    8.1<L>      19 Oct 2018 19:44  Mg     1.7     10-19    TPro  5.9<L>  /  Alb  2.8<L>  /  TBili  0.4  /  DBili  x   /  AST  18  /  ALT  14  /  AlkPhos  73  10-19    LIVER FUNCTIONS - ( 19 Oct 2018 19:44 )  Alb: 2.8 g/dL / Pro: 5.9 g/dL / ALK PHOS: 73 U/L / ALT: 14 U/L / AST: 18 U/L / GGT: x           Urinalysis Basic - ( 19 Oct 2018 19:44 )    Color: Yellow / Appearance: Slightly Turbid / S.016 / pH: x  Gluc: x / Ketone: Small  / Bili: Negative / Urobili: Negative   Blood: x / Protein: 30 mg/dL / Nitrite: Negative   Leuk Esterase: Negative / RBC: 1 /hpf / WBC 8 /hpf   Sq Epi: x / Non Sq Epi: 4 /hpf / Bacteria: Negative    CXR reviewed-no obvious cavitating lesions, formal radiology read still pending  CT Chest/Abdomen/Pelvis ordered with po contrast    EKG personally reviewed-significantly prolonged QT interval, QTc 596ms Labs personally reviewed:                        8.1    7.3   )-----------( 132      ( 19 Oct 2018 19:44 )             24.6     10    135  |  90<L>  |  15  ----------------------------<  127<H>  2.6<LL>   |  31  |  1.60<H>    Ca    8.1<L>      19 Oct 2018 19:44  Mg     1.7     10-19    TPro  5.9<L>  /  Alb  2.8<L>  /  TBili  0.4  /  DBili  x   /  AST  18  /  ALT  14  /  AlkPhos  73  10-19    LIVER FUNCTIONS - ( 19 Oct 2018 19:44 )  Alb: 2.8 g/dL / Pro: 5.9 g/dL / ALK PHOS: 73 U/L / ALT: 14 U/L / AST: 18 U/L / GGT: x           Urinalysis Basic - ( 19 Oct 2018 19:44 )    Color: Yellow / Appearance: Slightly Turbid / S.016 / pH: x  Gluc: x / Ketone: Small  / Bili: Negative / Urobili: Negative   Blood: x / Protein: 30 mg/dL / Nitrite: Negative   Leuk Esterase: Negative / RBC: 1 /hpf / WBC 8 /hpf   Sq Epi: x / Non Sq Epi: 4 /hpf / Bacteria: Negative    CXR reviewed-no obvious cavitating lesions, formal radiology read still pending  CT Chest/Abdomen/Pelvis ordered with po contrast  CT head pending result, no large subdural hematoma on preliminary review  EKG personally reviewed-significantly prolonged QT interval, QTc 596ms

## 2018-10-20 NOTE — H&P ADULT - PROBLEM SELECTOR PLAN 4
2/2 hypokalemia  Changed to telemetry admission  Monitor on telemetry  Replete K+, will give further 3 runs of IV 10meq now  Check K+ and Mg now  Replete Mag

## 2018-10-20 NOTE — PROGRESS NOTE ADULT - PROBLEM SELECTOR PLAN 1
Pt with multiple episodes of melanotic diarrhea as well as severe weakness causing likely orthostasis and fall x 2 episodes.  Protonix 40mg IVq12  CT A/P with no concerning findings  GI consulted  Trend CBC

## 2018-10-21 LAB
ANION GAP SERPL CALC-SCNC: 12 MMOL/L — SIGNIFICANT CHANGE UP (ref 5–17)
ANION GAP SERPL CALC-SCNC: 9 MMOL/L — SIGNIFICANT CHANGE UP (ref 5–17)
ANISOCYTOSIS BLD QL: SIGNIFICANT CHANGE UP
BASOPHILS # BLD AUTO: 0 K/UL — SIGNIFICANT CHANGE UP (ref 0–0.2)
BASOPHILS NFR BLD AUTO: 0 % — SIGNIFICANT CHANGE UP (ref 0–2)
BUN SERPL-MCNC: 6 MG/DL — LOW (ref 7–23)
BUN SERPL-MCNC: 6 MG/DL — LOW (ref 7–23)
CALCIUM SERPL-MCNC: 7.2 MG/DL — LOW (ref 8.4–10.5)
CALCIUM SERPL-MCNC: 7.7 MG/DL — LOW (ref 8.4–10.5)
CHLORIDE SERPL-SCNC: 101 MMOL/L — SIGNIFICANT CHANGE UP (ref 96–108)
CHLORIDE SERPL-SCNC: 103 MMOL/L — SIGNIFICANT CHANGE UP (ref 96–108)
CO2 SERPL-SCNC: 27 MMOL/L — SIGNIFICANT CHANGE UP (ref 22–31)
CO2 SERPL-SCNC: 29 MMOL/L — SIGNIFICANT CHANGE UP (ref 22–31)
CREAT SERPL-MCNC: 0.88 MG/DL — SIGNIFICANT CHANGE UP (ref 0.5–1.3)
CREAT SERPL-MCNC: 0.91 MG/DL — SIGNIFICANT CHANGE UP (ref 0.5–1.3)
ELLIPTOCYTES BLD QL SMEAR: SLIGHT — SIGNIFICANT CHANGE UP
EOSINOPHIL # BLD AUTO: 0 K/UL — SIGNIFICANT CHANGE UP (ref 0–0.5)
EOSINOPHIL NFR BLD AUTO: 0 % — SIGNIFICANT CHANGE UP (ref 0–6)
FERRITIN SERPL-MCNC: 956 NG/ML — HIGH (ref 30–400)
FOLATE SERPL-MCNC: 9.7 NG/ML — SIGNIFICANT CHANGE UP
GLUCOSE SERPL-MCNC: 105 MG/DL — HIGH (ref 70–99)
GLUCOSE SERPL-MCNC: 111 MG/DL — HIGH (ref 70–99)
HCT VFR BLD CALC: 23.1 % — LOW (ref 39–50)
HGB BLD-MCNC: 7.4 G/DL — LOW (ref 13–17)
HYPOCHROMIA BLD QL: SLIGHT — SIGNIFICANT CHANGE UP
IRON SATN MFR SERPL: 18 % — SIGNIFICANT CHANGE UP (ref 16–55)
IRON SATN MFR SERPL: 31 UG/DL — LOW (ref 45–165)
LG PLATELETS BLD QL AUTO: SLIGHT — SIGNIFICANT CHANGE UP
LYMPHOCYTES # BLD AUTO: 0.9 K/UL — LOW (ref 1–3.3)
LYMPHOCYTES # BLD AUTO: 9 % — LOW (ref 13–44)
MACROCYTES BLD QL: SLIGHT — SIGNIFICANT CHANGE UP
MANUAL SMEAR VERIFICATION: SIGNIFICANT CHANGE UP
MCHC RBC-ENTMCNC: 21.8 PG — LOW (ref 27–34)
MCHC RBC-ENTMCNC: 32 GM/DL — SIGNIFICANT CHANGE UP (ref 32–36)
MCV RBC AUTO: 67.9 FL — LOW (ref 80–100)
MICROCYTES BLD QL: SIGNIFICANT CHANGE UP
MONOCYTES # BLD AUTO: 1.1 K/UL — HIGH (ref 0–0.9)
MONOCYTES NFR BLD AUTO: 11 % — SIGNIFICANT CHANGE UP (ref 2–14)
MYELOCYTES NFR BLD: 2 % — HIGH (ref 0–0)
NEUTROPHILS # BLD AUTO: 7.79 K/UL — HIGH (ref 1.8–7.4)
NEUTROPHILS NFR BLD AUTO: 77 % — SIGNIFICANT CHANGE UP (ref 43–77)
NEUTS BAND # BLD: 1 % — SIGNIFICANT CHANGE UP (ref 0–8)
NRBC # BLD: 0 /100 — SIGNIFICANT CHANGE UP (ref 0–0)
PLAT MORPH BLD: NORMAL — SIGNIFICANT CHANGE UP
PLATELET # BLD AUTO: 213 K/UL — SIGNIFICANT CHANGE UP (ref 150–400)
POIKILOCYTOSIS BLD QL AUTO: SLIGHT — SIGNIFICANT CHANGE UP
POLYCHROMASIA BLD QL SMEAR: SLIGHT — SIGNIFICANT CHANGE UP
POTASSIUM SERPL-MCNC: 3.5 MMOL/L — SIGNIFICANT CHANGE UP (ref 3.5–5.3)
POTASSIUM SERPL-MCNC: 3.8 MMOL/L — SIGNIFICANT CHANGE UP (ref 3.5–5.3)
POTASSIUM SERPL-SCNC: 3.5 MMOL/L — SIGNIFICANT CHANGE UP (ref 3.5–5.3)
POTASSIUM SERPL-SCNC: 3.8 MMOL/L — SIGNIFICANT CHANGE UP (ref 3.5–5.3)
RBC # BLD: 3.4 M/UL — LOW (ref 4.2–5.8)
RBC # FLD: 14.6 % — HIGH (ref 10.3–14.5)
RBC BLD AUTO: ABNORMAL
SODIUM SERPL-SCNC: 139 MMOL/L — SIGNIFICANT CHANGE UP (ref 135–145)
SODIUM SERPL-SCNC: 142 MMOL/L — SIGNIFICANT CHANGE UP (ref 135–145)
TARGETS BLD QL SMEAR: SLIGHT — SIGNIFICANT CHANGE UP
TIBC SERPL-MCNC: 172 UG/DL — LOW (ref 220–430)
UIBC SERPL-MCNC: 141 UG/DL — SIGNIFICANT CHANGE UP (ref 110–370)
VIT B12 SERPL-MCNC: >2000 PG/ML — HIGH (ref 232–1245)
WBC # BLD: 9.99 K/UL — SIGNIFICANT CHANGE UP (ref 3.8–10.5)
WBC # FLD AUTO: 9.99 K/UL — SIGNIFICANT CHANGE UP (ref 3.8–10.5)

## 2018-10-21 PROCEDURE — 99232 SBSQ HOSP IP/OBS MODERATE 35: CPT

## 2018-10-21 PROCEDURE — 99233 SBSQ HOSP IP/OBS HIGH 50: CPT

## 2018-10-21 RX ADMIN — PANTOPRAZOLE SODIUM 40 MILLIGRAM(S): 20 TABLET, DELAYED RELEASE ORAL at 18:29

## 2018-10-21 RX ADMIN — Medication 100 MILLIGRAM(S): at 13:49

## 2018-10-21 RX ADMIN — Medication 600 MILLIGRAM(S): at 18:29

## 2018-10-21 RX ADMIN — PANTOPRAZOLE SODIUM 40 MILLIGRAM(S): 20 TABLET, DELAYED RELEASE ORAL at 05:42

## 2018-10-21 RX ADMIN — Medication 100 MILLIGRAM(S): at 22:00

## 2018-10-21 RX ADMIN — Medication 100 MILLIGRAM(S): at 05:42

## 2018-10-21 NOTE — PROGRESS NOTE ADULT - SUBJECTIVE AND OBJECTIVE BOX
Chief Complaint:  Patient is a 78y old  Male who presents with a chief complaint of Severe hypokalemia/Prolonged QTc interval/GI bleed w/melena/Nausea/vomiting/diarrhea/coughing/weakness and fall with head strike (20 Oct 2018 12:40)      Interval Events:     Allergies:  No Known Allergies      Hospital Medications:  benzonatate 100 milliGRAM(s) Oral every 8 hours  influenza   Vaccine 0.5 milliLiter(s) IntraMuscular once  LORazepam   Injectable 0.5 milliGRAM(s) IV Push every 6 hours PRN  pantoprazole  Injectable 40 milliGRAM(s) IV Push two times a day  sodium chloride 0.9%. 1000 milliLiter(s) IV Continuous <Continuous>      PMHX/PSHX:  Gout  TB (tuberculosis)  HTN (hypertension)  Gastric lymphoma  No significant past surgical history      Family history:  No pertinent family history in first degree relatives      ROS:     General:  No wt loss, fevers, chills, night sweats, fatigue,   Eyes:  Good vision, no reported pain  ENT:  No sore throat, pain, runny nose, dysphagia  CV:  No pain, palpitations, hypo/hypertension  Resp:  No dyspnea, cough, tachypnea, wheezing  GI:  See HPI  :  No pain, bleeding, incontinence, nocturia  Muscle:  No pain, weakness  Neuro:  No weakness, tingling, memory problems  Psych:  No fatigue, insomnia, mood problems, depression  Endocrine:  No polyuria, polydipsia, cold/heat intolerance  Heme:  No petechiae, ecchymosis, easy bruisability  Skin:  No rash, edema      PHYSICAL EXAM:     GENERAL:  Appears stated age, well-groomed, well-nourished, no distress  HEENT:  NC/AT,  conjunctivae clear, sclera -anicteric  CHEST:  Full & symmetric excursion, no increased effort, breath sounds clear  HEART:  Regular rhythm, S1, S2, no murmur/rub/S3/S4,  no edema  ABDOMEN:  Soft, non-tender, non-distended, normoactive bowel sounds,  no masses ,no hepato-splenomegaly,   EXTREMITIES:  no cyanosis,clubbing or edema  SKIN:  No rash/erythema/ecchymoses/petechiae/wounds/abscess/warm/dry  NEURO:  Alert, oriented    Vital Signs:  Vital Signs Last 24 Hrs  T(C): 36.8 (21 Oct 2018 05:21), Max: 37.2 (20 Oct 2018 21:04)  T(F): 98.3 (21 Oct 2018 05:21), Max: 98.9 (20 Oct 2018 21:04)  HR: 84 (21 Oct 2018 05:21) (83 - 95)  BP: 138/76 (21 Oct 2018 05:21) (124/69 - 140/61)  BP(mean): --  RR: 18 (21 Oct 2018 05:21) (18 - 18)  SpO2: 96% (21 Oct 2018 05:21) (96% - 100%)  Daily Height in cm: 165.1 (20 Oct 2018 14:43)    Daily     LABS:                        7.2    6.6   )-----------( 142      ( 20 Oct 2018 13:08 )             21.6     10-21    142  |  103  |  6<L>  ----------------------------<  111<H>  3.8   |  27  |  0.88    Ca    7.7<L>      21 Oct 2018 06:51  Phos  1.5     10-20  Mg     1.8     10-20    TPro  5.9<L>  /  Alb  2.8<L>  /  TBili  0.4  /  DBili  x   /  AST  18  /  ALT  14  /  AlkPhos  73  10-19    LIVER FUNCTIONS - ( 19 Oct 2018 19:44 )  Alb: 2.8 g/dL / Pro: 5.9 g/dL / ALK PHOS: 73 U/L / ALT: 14 U/L / AST: 18 U/L / GGT: x             Urinalysis Basic - ( 19 Oct 2018 19:44 )    Color: Yellow / Appearance: Slightly Turbid / S.016 / pH: x  Gluc: x / Ketone: Small  / Bili: Negative / Urobili: Negative   Blood: x / Protein: 30 mg/dL / Nitrite: Negative   Leuk Esterase: Negative / RBC: 1 /hpf / WBC 8 /hpf   Sq Epi: x / Non Sq Epi: 4 /hpf / Bacteria: Negative          Imaging: Chief Complaint:  Patient is a 78y old  Male who presents with a chief complaint of Severe hypokalemia/Prolonged QTc interval/GI bleed w/melena/Nausea/vomiting/diarrhea/coughing/weakness and fall with head strike (20 Oct 2018 12:40)      Interval Events: Pt moved to isolation given concern for TB.  Patient with persistent cough, has not been able to provide sputum.  No bowel movements since before admission.     Allergies:  No Known Allergies      Hospital Medications:  benzonatate 100 milliGRAM(s) Oral every 8 hours  influenza   Vaccine 0.5 milliLiter(s) IntraMuscular once  LORazepam   Injectable 0.5 milliGRAM(s) IV Push every 6 hours PRN  pantoprazole  Injectable 40 milliGRAM(s) IV Push two times a day  sodium chloride 0.9%. 1000 milliLiter(s) IV Continuous <Continuous>      PMHX/PSHX:  Gout  TB (tuberculosis)  HTN (hypertension)  Gastric lymphoma  No significant past surgical history      Family history:  No pertinent family history in first degree relatives      ROS:     General:  No wt loss, fevers, chills, night sweats, fatigue,   Eyes:  Good vision, no reported pain  ENT:  No sore throat, pain, runny nose, dysphagia  CV:  No pain, palpitations, hypo/hypertension  Resp:  + cough  GI:  See HPI  :  No pain, bleeding, incontinence, nocturia  Muscle:  No pain, weakness  Neuro:  No weakness, tingling, memory problems  Psych:  No fatigue, insomnia, mood problems, depression  Endocrine:  No polyuria, polydipsia, cold/heat intolerance  Heme:  No petechiae, ecchymosis, easy bruisability  Skin:  No rash, edema      PHYSICAL EXAM:     GENERAL:  NAD, intermittent coughing, appears ill  HEENT:  NC/AT,  conjunctivae clear, sclera -anicteric  CHEST: Intermittent cough  ABDOMEN:  Soft, non-tender, non-distended, normoactive bowel sounds  DEJON: brown stool on rectal without blood   EXTREMITIES:  no cyanosis,clubbing or edema  SKIN:  No rash  NEURO:  Alert, oriented    Vital Signs:  Vital Signs Last 24 Hrs  T(C): 36.8 (21 Oct 2018 05:21), Max: 37.2 (20 Oct 2018 21:04)  T(F): 98.3 (21 Oct 2018 05:21), Max: 98.9 (20 Oct 2018 21:04)  HR: 84 (21 Oct 2018 05:21) (83 - 95)  BP: 138/76 (21 Oct 2018 05:21) (124/69 - 140/61)  BP(mean): --  RR: 18 (21 Oct 2018 05:21) (18 - 18)  SpO2: 96% (21 Oct 2018 05:21) (96% - 100%)  Daily Height in cm: 165.1 (20 Oct 2018 14:43)    Daily     LABS:                        7.2    6.6   )-----------( 142      ( 20 Oct 2018 13:08 )             21.6     10-21    142  |  103  |  6<L>  ----------------------------<  111<H>  3.8   |  27  |  0.88    Ca    7.7<L>      21 Oct 2018 06:51  Phos  1.5     10-20  Mg     1.8     10-20    TPro  5.9<L>  /  Alb  2.8<L>  /  TBili  0.4  /  DBili  x   /  AST  18  /  ALT  14  /  AlkPhos  73  10-19    LIVER FUNCTIONS - ( 19 Oct 2018 19:44 )  Alb: 2.8 g/dL / Pro: 5.9 g/dL / ALK PHOS: 73 U/L / ALT: 14 U/L / AST: 18 U/L / GGT: x             Urinalysis Basic - ( 19 Oct 2018 19:44 )    Color: Yellow / Appearance: Slightly Turbid / S.016 / pH: x  Gluc: x / Ketone: Small  / Bili: Negative / Urobili: Negative   Blood: x / Protein: 30 mg/dL / Nitrite: Negative   Leuk Esterase: Negative / RBC: 1 /hpf / WBC 8 /hpf   Sq Epi: x / Non Sq Epi: 4 /hpf / Bacteria: Negative          Imaging:  reviewed

## 2018-10-21 NOTE — PROGRESS NOTE ADULT - PROBLEM SELECTOR PLAN 1
Pt with multiple episodes of melanotic diarrhea at home, brown stool here.   Protonix 40mg IVq12  CT A/P with no concerning findings  GI consulted  Trend CBC

## 2018-10-21 NOTE — PROGRESS NOTE ADULT - ASSESSMENT
Impression:  79yo M with gastric lymphoma on chemo presents with anemia, fatigue.    Problems:  1) Microcytic anemia - no signs of overt GI bleeding (brown stool on exam today and no BMs since admission, and Hgb has somewhat stabilized) - could be effect of chemo vs bleed (ie tumor bleed vs gastritis/esophagitis vs pud)  2) Cough, low grade temps - concern for infection, reactivation of latent TB  3) gastric lymphoma, on R-CHOP, first session 10/9    Recs:  - followup infectious evaluation (including TB evaluation)  - trend CBC, transfuse if Hgb <7  - PPI IV BID is fine  - ensure lytes replete  - clear liquid diet as tolerated from GI standpoint  - obtain outpatient endoscopy and oncology records for review  - if patient develops overt GI bleeding or progressive anemia can consider endoscopy at some point next week, assuming TB status is determined as well

## 2018-10-21 NOTE — PROGRESS NOTE ADULT - PROBLEM SELECTOR PLAN 2
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 Hgb remains stable. No episodes of melena here  Trend CBC QD  Transfuse to HGB >7  Check Iron studies  Will need to obtain outpatient records when office is open tomorrow

## 2018-10-21 NOTE — PROGRESS NOTE ADULT - PROBLEM SELECTOR PLAN 5
CT Chest with Small cystic area right upper lobe concern for reactivation of latent TB  - check AFBs

## 2018-10-21 NOTE — PROGRESS NOTE ADULT - PROBLEM SELECTOR PLAN 4
Likely 2/2 GALT s/p chemo  Unable to give Zofran or Reglan due to significant QT prolongation  c/w ativan IV PRN for nausea/vomiting for now  Replete K+ and should repeat EKG when K+ normalized to see if QT prolongation resolved and may be OK to start zofran

## 2018-10-22 LAB
ANION GAP SERPL CALC-SCNC: 13 MMOL/L — SIGNIFICANT CHANGE UP (ref 5–17)
BUN SERPL-MCNC: 4 MG/DL — LOW (ref 7–23)
CALCIUM SERPL-MCNC: 8.2 MG/DL — LOW (ref 8.4–10.5)
CHLORIDE SERPL-SCNC: 101 MMOL/L — SIGNIFICANT CHANGE UP (ref 96–108)
CO2 SERPL-SCNC: 27 MMOL/L — SIGNIFICANT CHANGE UP (ref 22–31)
CREAT SERPL-MCNC: 0.99 MG/DL — SIGNIFICANT CHANGE UP (ref 0.5–1.3)
GLUCOSE SERPL-MCNC: 120 MG/DL — HIGH (ref 70–99)
HCT VFR BLD CALC: 24.1 % — LOW (ref 39–50)
HGB BLD-MCNC: 7.8 G/DL — LOW (ref 13–17)
MCHC RBC-ENTMCNC: 22.2 PG — LOW (ref 27–34)
MCHC RBC-ENTMCNC: 32.4 GM/DL — SIGNIFICANT CHANGE UP (ref 32–36)
MCV RBC AUTO: 68.5 FL — LOW (ref 80–100)
NRBC # BLD: 1 /100 WBCS — HIGH (ref 0–0)
PLATELET # BLD AUTO: 270 K/UL — SIGNIFICANT CHANGE UP (ref 150–400)
POTASSIUM SERPL-MCNC: 3.8 MMOL/L — SIGNIFICANT CHANGE UP (ref 3.5–5.3)
POTASSIUM SERPL-SCNC: 3.8 MMOL/L — SIGNIFICANT CHANGE UP (ref 3.5–5.3)
RBC # BLD: 3.52 M/UL — LOW (ref 4.2–5.8)
RBC # FLD: 15 % — HIGH (ref 10.3–14.5)
SODIUM SERPL-SCNC: 141 MMOL/L — SIGNIFICANT CHANGE UP (ref 135–145)
WBC # BLD: 10.57 K/UL — HIGH (ref 3.8–10.5)
WBC # FLD AUTO: 10.57 K/UL — HIGH (ref 3.8–10.5)

## 2018-10-22 PROCEDURE — 93010 ELECTROCARDIOGRAM REPORT: CPT

## 2018-10-22 PROCEDURE — 99233 SBSQ HOSP IP/OBS HIGH 50: CPT

## 2018-10-22 PROCEDURE — 99232 SBSQ HOSP IP/OBS MODERATE 35: CPT | Mod: GC

## 2018-10-22 PROCEDURE — 93970 EXTREMITY STUDY: CPT | Mod: 26

## 2018-10-22 PROCEDURE — 99223 1ST HOSP IP/OBS HIGH 75: CPT | Mod: GC

## 2018-10-22 RX ORDER — PYRIDOXINE HCL (VITAMIN B6) 100 MG
50 TABLET ORAL DAILY
Qty: 0 | Refills: 0 | Status: DISCONTINUED | OUTPATIENT
Start: 2018-10-22 | End: 2018-10-25

## 2018-10-22 RX ORDER — HEXAVITAMINS
300 TABLET ORAL DAILY
Qty: 0 | Refills: 0 | Status: DISCONTINUED | OUTPATIENT
Start: 2018-10-22 | End: 2018-10-25

## 2018-10-22 RX ORDER — ENOXAPARIN SODIUM 100 MG/ML
40 INJECTION SUBCUTANEOUS DAILY
Qty: 0 | Refills: 0 | Status: DISCONTINUED | OUTPATIENT
Start: 2018-10-22 | End: 2018-10-25

## 2018-10-22 RX ADMIN — Medication 100 MILLIGRAM(S): at 21:48

## 2018-10-22 RX ADMIN — SODIUM CHLORIDE 50 MILLILITER(S): 9 INJECTION INTRAMUSCULAR; INTRAVENOUS; SUBCUTANEOUS at 11:02

## 2018-10-22 RX ADMIN — Medication 300 MILLIGRAM(S): at 21:49

## 2018-10-22 RX ADMIN — PANTOPRAZOLE SODIUM 40 MILLIGRAM(S): 20 TABLET, DELAYED RELEASE ORAL at 06:48

## 2018-10-22 RX ADMIN — ENOXAPARIN SODIUM 40 MILLIGRAM(S): 100 INJECTION SUBCUTANEOUS at 13:36

## 2018-10-22 RX ADMIN — SODIUM CHLORIDE 50 MILLILITER(S): 9 INJECTION INTRAMUSCULAR; INTRAVENOUS; SUBCUTANEOUS at 06:48

## 2018-10-22 RX ADMIN — SODIUM CHLORIDE 50 MILLILITER(S): 9 INJECTION INTRAMUSCULAR; INTRAVENOUS; SUBCUTANEOUS at 21:48

## 2018-10-22 RX ADMIN — PANTOPRAZOLE SODIUM 40 MILLIGRAM(S): 20 TABLET, DELAYED RELEASE ORAL at 17:47

## 2018-10-22 RX ADMIN — Medication 100 MILLIGRAM(S): at 06:48

## 2018-10-22 RX ADMIN — Medication 100 MILLIGRAM(S): at 13:35

## 2018-10-22 RX ADMIN — Medication 600 MILLIGRAM(S): at 17:47

## 2018-10-22 RX ADMIN — Medication 600 MILLIGRAM(S): at 06:48

## 2018-10-22 NOTE — PROGRESS NOTE ADULT - SUBJECTIVE AND OBJECTIVE BOX
SUBJECTIVE:  Seen with son and daughter at bedside.  Patient interviewed with Cantonese  #[id number].  Reports persistent cough, non-productive.  No SOB.  No N/V.  On questioning reports left great toe pain from an ingrown toenail.  NAD.    Tele: NSR on tele.  Reported episodes of sinus tach earlier.  MEDICATIONS  (STANDING):  benzonatate 100 milliGRAM(s) Oral every 8 hours  guaiFENesin  milliGRAM(s) Oral every 12 hours  influenza   Vaccine 0.5 milliLiter(s) IntraMuscular once  pantoprazole  Injectable 40 milliGRAM(s) IV Push two times a day  sodium chloride 0.9%. 1000 milliLiter(s) (50 mL/Hr) IV Continuous <Continuous>    MEDICATIONS  (PRN):  LORazepam   Injectable 0.5 milliGRAM(s) IV Push every 6 hours PRN Nausea and/or Vomiting      Vital Signs Last 24 Hrs  T(C): 36.6 (22 Oct 2018 05:43), Max: 36.7 (21 Oct 2018 12:28)  T(F): 97.9 (22 Oct 2018 05:43), Max: 98 (21 Oct 2018 12:28)  HR: 92 (22 Oct 2018 05:43) (92 - 103)  BP: 150/72 (22 Oct 2018 05:43) (112/63 - 158/68)  BP(mean): --  RR: 18 (22 Oct 2018 05:43) (18 - 18)  SpO2: 97% (22 Oct 2018 05:43) (97% - 100%)    CAPILLARY BLOOD GLUCOSE        I&O's Summary    21 Oct 2018 07:01  -  22 Oct 2018 07:00  --------------------------------------------------------  IN: 2230 mL / OUT: 3370 mL / NET: -1140 mL    22 Oct 2018 07:01  -  22 Oct 2018 11:27  --------------------------------------------------------  IN: 240 mL / OUT: 300 mL / NET: -60 mL        PHYSICAL EXAM:  GENERAL: Looks stated age, NAD.  CARDIOVASCULAR: Normal S1, S2.  PULMONARY: Lungs clear to auscultation bilaterally. No wheezes/rales/rhonchi  GI: Abdomen soft, Nontender. Bowel sounds present  MSK/Ext:  Trace to 1+ mild distal leg edema bilaterally, L>R.  TTP on medial aspect of left great toe along the nail bed.  No erythema noted.  No calf tenderness bilaterally  PSYCH: Normal Affect. AAOx3      LABS:                        7.4    9.99  )-----------( 213      ( 21 Oct 2018 08:18 )             23.1     10-22    141  |  101  |  4<L>  ----------------------------<  120<H>  3.8   |  27  |  0.99    Ca    8.2<L>      22 Oct 2018 06:23  Mg     1.8     10-20                  RADIOLOGY & ADDITIONAL TESTS:

## 2018-10-22 NOTE — CONSULT NOTE ADULT - SUBJECTIVE AND OBJECTIVE BOX
CHIEF COMPLAINT: Weakness    HPI:   Pt is a 78M with PMHx gastric lymphoma on RChop (last 10/9), HTN, gout, and latent TB on INH presenting s/p multiple falls with progressive weakness x1 week admitted for GIB, hypokalemia, SUJATHA, and FTT. Pulmonary consulted for RUL pulmonary cyst.    PAST MEDICAL & SURGICAL HISTORY:  Gout  TB (tuberculosis)  HTN (hypertension)  Gastric lymphoma  No significant past surgical history      FAMILY HISTORY:  No pertinent family history in first degree relatives        Allergies    No Known Allergies    Intolerances        HOME MEDICATIONS:    REVIEW OF SYSTEMS:  Constitutional: [ ] negative [ ] fevers [ ] chills [ ] weight loss [ ] weight gain  HEENT: [ ] negative [ ] dry eyes [ ] eye irritation [ ] postnasal drip [ ] nasal congestion  CV: [ ] negative  [ ] chest pain [ ] orthopnea [ ] palpitations [ ] murmur  Resp: [ ] negative [ ] cough [ ] shortness of breath [ ] dyspnea [ ] wheezing [ ] sputum [ ] hemoptysis  GI: [ ] negative [ ] nausea [ ] vomiting [ ] diarrhea [ ] constipation [ ] abd pain [ ] dysphagia   : [ ] negative [ ] dysuria [ ] nocturia [ ] hematuria [ ] increased urinary frequency  Musculoskeletal: [ ] negative [ ] back pain [ ] myalgias [ ] arthralgias [ ] fracture  Skin: [ ] negative [ ] rash [ ] itch  Neurological: [ ] negative [ ] headache [ ] dizziness [ ] syncope [ ] weakness [ ] numbness  Psychiatric: [ ] negative [ ] anxiety [ ] depression  Endocrine: [ ] negative [ ] diabetes [ ] thyroid problem  Hematologic/Lymphatic: [ ] negative [ ] anemia [ ] bleeding problem  Allergic/Immunologic: [ ] negative [ ] itchy eyes [ ] nasal discharge [ ] hives [ ] angioedema  [x ] All other systems negative  [ ] Unable to assess ROS because ________    OBJECTIVE:  ICU Vital Signs Last 24 Hrs  T(C): 37.1 (22 Oct 2018 12:17), Max: 37.1 (22 Oct 2018 12:17)  T(F): 98.8 (22 Oct 2018 12:17), Max: 98.8 (22 Oct 2018 12:17)  HR: 87 (22 Oct 2018 12:17) (87 - 100)  BP: 104/62 (22 Oct 2018 12:30) (93/60 - 158/68)  BP(mean): --  ABP: --  ABP(mean): --  RR: 18 (22 Oct 2018 12:17) (18 - 18)  SpO2: 98% (22 Oct 2018 12:17) (97% - 100%)        10-21 @ 07:01  -  10-22 @ 07:00  --------------------------------------------------------  IN: 2230 mL / OUT: 3370 mL / NET: -1140 mL    10-22 @ 07:01  -  10-22 @ 17:13  --------------------------------------------------------  IN: 480 mL / OUT: 600 mL / NET: -120 mL      CAPILLARY BLOOD GLUCOSE      PHYSICAL EXAM:  General: NAD, lying in bed comfortably  HEENT: NCAT, MOM, DANIEL/EOMI  Respiratory: CTA b/l (-) wheezing/rhonchi/rales  Cardiovascular: RRR, S1/S2, (-)m/g/r  Abdomen: NT/ND  Extremities: (-) cyanosis/clubbing/edema  Skin: c/d/i  Neurological: Nonfocal findings  Psychiatry: Appropriate       HOSPITAL MEDICATIONS:  enoxaparin Injectable 40 milliGRAM(s) SubCutaneous daily          benzonatate 100 milliGRAM(s) Oral every 8 hours  guaiFENesin  milliGRAM(s) Oral every 12 hours    LORazepam   Injectable 0.5 milliGRAM(s) IV Push every 6 hours PRN    pantoprazole  Injectable 40 milliGRAM(s) IV Push two times a day        sodium chloride 0.9%. 1000 milliLiter(s) IV Continuous <Continuous>    influenza   Vaccine 0.5 milliLiter(s) IntraMuscular once          LABS:                        7.8    10.57 )-----------( 270      ( 22 Oct 2018 09:04 )             24.1     Hgb Trend: 7.8<--, 7.4<--, 7.2<--, 7.4<--, 8.1<--  10-22    141  |  101  |  4<L>  ----------------------------<  120<H>  3.8   |  27  |  0.99    Ca    8.2<L>      22 Oct 2018 06:23      Creatinine Trend: 0.99<--, 0.88<--, 0.91<--, 0.88<--, 1.03<--, 1.60<--            MICROBIOLOGY: None    RADIOLOGY:  [ x] Reviewed and interpreted by me

## 2018-10-22 NOTE — CONSULT NOTE ADULT - ASSESSMENT
Pt is a 78M with PMHx gastric lymphoma on RChop (last 10/9), HTN, gout, and latent TB on INH presenting s/p multiple falls with progressive weakness x1 week admissted for GIB, hypokalemia, SUJATHA, and FTT. Pulmonary consulted for RUL pulmonary cyst.   -CT Chest and clinical presentation not concerning for active pulmonary TB. Can d/c airborne isolation precautions  -Please restart pt on INH 300mg QD and B6 50mg QD therapy   -Pulmonary to sign off. Please call or reconsult as needed

## 2018-10-22 NOTE — PROGRESS NOTE ADULT - PROBLEM SELECTOR PLAN 3
CT Chest with Small cystic area right upper lobe concern for reactivation of latent TB.  Check sputum for AFB.  Reportedly on INH as outpatient, currently not ordered here.  Pulm consult requested.

## 2018-10-22 NOTE — PROGRESS NOTE ADULT - PROBLEM SELECTOR PLAN 1
Pt with multiple episodes of melanotic diarrhea at home, brown stool here.   CT A/P with no concerning findings.  Protonix 40mg IVq12.  Contacting outpatient GI to obtain EGD report.  Iron studies noted, most c/w anemia of chronic disease.  Trend CBC.  Case d/w Dr. Noyola (GI).

## 2018-10-22 NOTE — PROGRESS NOTE ADULT - SUBJECTIVE AND OBJECTIVE BOX
Chief Complaint:  Patient is a 78y old  Male who presents with a chief complaint of Severe hypokalemia/Prolonged QTc interval/GI bleed w/melena/Nausea/vomiting/diarrhea/coughing/weakness and fall with head strike (21 Oct 2018 12:37)      Interval Events: No events overnight.  Still with cough.  No further bowel movements.     Allergies:  No Known Allergies      Hospital Medications:  benzonatate 100 milliGRAM(s) Oral every 8 hours  guaiFENesin  milliGRAM(s) Oral every 12 hours  influenza   Vaccine 0.5 milliLiter(s) IntraMuscular once  LORazepam   Injectable 0.5 milliGRAM(s) IV Push every 6 hours PRN  pantoprazole  Injectable 40 milliGRAM(s) IV Push two times a day  sodium chloride 0.9%. 1000 milliLiter(s) IV Continuous <Continuous>      PMHX/PSHX:  Gout  TB (tuberculosis)  HTN (hypertension)  Gastric lymphoma  No significant past surgical history      Family history:  No pertinent family history in first degree relatives      ROS:     General:  No wt loss, fevers, chills, night sweats, fatigue,   Eyes:  Good vision, no reported pain  ENT:  No sore throat, pain, runny nose, dysphagia  CV:  No pain, palpitations, hypo/hypertension  Resp:  No dyspnea, cough, tachypnea, wheezing  GI:  See HPI  :  No pain, bleeding, incontinence, nocturia  Muscle:  No pain, weakness  Neuro:  No weakness, tingling, memory problems  Psych:  No fatigue, insomnia, mood problems, depression  Endocrine:  No polyuria, polydipsia, cold/heat intolerance  Heme:  No petechiae, ecchymosis, easy bruisability  Skin:  No rash, edema      PHYSICAL EXAM:     GENERAL:  Appears stated age, well-groomed, well-nourished, no distress  HEENT:  NC/AT,  conjunctivae clear, sclera -anicteric  CHEST:  Full & symmetric excursion, no increased effort  ABDOMEN:  Soft, non-tender, non-distended  EXTREMITIES:  no cyanosis,clubbing or edema  SKIN:  No rash  NEURO:  Alert    Vital Signs:  Vital Signs Last 24 Hrs  T(C): 36.6 (22 Oct 2018 05:43), Max: 36.7 (21 Oct 2018 12:28)  T(F): 97.9 (22 Oct 2018 05:43), Max: 98 (21 Oct 2018 12:28)  HR: 92 (22 Oct 2018 05:43) (92 - 103)  BP: 150/72 (22 Oct 2018 05:43) (112/63 - 158/68)  BP(mean): --  RR: 18 (22 Oct 2018 05:43) (18 - 18)  SpO2: 97% (22 Oct 2018 05:43) (97% - 100%)  Daily     Daily     LABS:                        7.4    9.99  )-----------( 213      ( 21 Oct 2018 08:18 )             23.1     10-22    141  |  101  |  4<L>  ----------------------------<  120<H>  3.8   |  27  |  0.99    Ca    8.2<L>      22 Oct 2018 06:23  Mg     1.8     10-20                Imaging:    reviewed Chief Complaint:  Patient is a 78y old  Male who presents with a chief complaint of Severe hypokalemia/Prolonged QTc interval/GI bleed w/melena/Nausea/vomiting/diarrhea/coughing/weakness and fall with head strike (21 Oct 2018 12:37)      Interval Events: No events overnight.  Still with cough.  No further bowel movements.     Allergies:  No Known Allergies      Hospital Medications:  benzonatate 100 milliGRAM(s) Oral every 8 hours  guaiFENesin  milliGRAM(s) Oral every 12 hours  influenza   Vaccine 0.5 milliLiter(s) IntraMuscular once  LORazepam   Injectable 0.5 milliGRAM(s) IV Push every 6 hours PRN  pantoprazole  Injectable 40 milliGRAM(s) IV Push two times a day  sodium chloride 0.9%. 1000 milliLiter(s) IV Continuous <Continuous>      PMHX/PSHX:  Gout  TB (tuberculosis)  HTN (hypertension)  Gastric lymphoma  No significant past surgical history      Family history:  No pertinent family history in first degree relatives      ROS:     General:  No wt loss, fevers, chills, night sweats, fatigue,   Eyes:  Good vision, no reported pain  ENT:  No sore throat, pain, runny nose, dysphagia  CV:  No pain, palpitations, hypo/hypertension  Resp:  No dyspnea, cough, tachypnea, wheezing  GI:  See HPI  :  No pain, bleeding, incontinence, nocturia  Muscle:  No pain, weakness  Neuro:  No weakness, tingling, memory problems  Psych:  No fatigue, insomnia, mood problems, depression  Endocrine:  No polyuria, polydipsia, cold/heat intolerance  Heme:  No petechiae, ecchymosis, easy bruisability  Skin:  No rash, edema      PHYSICAL EXAM:     GENERAL:  Appears stated age,thin, no distress  HEENT:  NC/AT,  conjunctivae clear, sclera -anicteric  CHEST:  Full & symmetric excursion, no increased effort  ABDOMEN:  Soft, non-tender, non-distended  EXTREMITIES:  no cyanosis,clubbing or edema  SKIN:  No rash  NEURO:  Alert    Vital Signs:  Vital Signs Last 24 Hrs  T(C): 36.6 (22 Oct 2018 05:43), Max: 36.7 (21 Oct 2018 12:28)  T(F): 97.9 (22 Oct 2018 05:43), Max: 98 (21 Oct 2018 12:28)  HR: 92 (22 Oct 2018 05:43) (92 - 103)  BP: 150/72 (22 Oct 2018 05:43) (112/63 - 158/68)  BP(mean): --  RR: 18 (22 Oct 2018 05:43) (18 - 18)  SpO2: 97% (22 Oct 2018 05:43) (97% - 100%)  Daily     Daily     LABS:                        7.4    9.99  )-----------( 213      ( 21 Oct 2018 08:18 )             23.1     10-22    141  |  101  |  4<L>  ----------------------------<  120<H>  3.8   |  27  |  0.99    Ca    8.2<L>      22 Oct 2018 06:23  Mg     1.8     10-20                Imaging:    reviewed

## 2018-10-22 NOTE — CONSULT NOTE ADULT - ATTENDING COMMENTS
History as noted above.  IN brief, consulted for opinion regarding presence of active TB in this 78 year old male with latent TB infection being treated with INH and history of gastric lymphoma currently undergoing treatment.  Chest CT shows thinwalled cyst in the RUL as well as in the MIRA.  There is no opacity to suggest pneumonia and cyst is thinwalled and not a cavity.  Patient was an active smoker years ago.  This is not consistent with active tuberculosis.  Would continue treatment for latent TB infection with INH and D/C respiratory isolation. Please reconsult as needed. History as noted above.  IN brief, consulted for opinion regarding presence of active TB in this 78 year old male with latent TB infection being treated with INH and history of gastric lymphoma currently undergoing treatment.  Chest CT shows thinwalled cyst in the RUL as well as in the MIRA.  There is no opacity to suggest pneumonia and cyst is thinwalled and not a cavity.  Patient was an active smoker years ago.  This is not consistent with active tuberculosis.  Would continue treatment for latent TB infection with INH and D/C respiratory isolation. Patient also reports persistent dry cough, which is likely upper airway cough syndrome.  HIs lungs are clear wihtout wheezing.  He is also on an AARB which can also cause cough.  Consider treatment with nasal steroid and H2 blocker for GERD.   Please reconsult as needed.

## 2018-10-22 NOTE — PROGRESS NOTE ADULT - ASSESSMENT
The patient is a 78-year-old man with PMH of gastric lymphoma on chemo (last chemotherapy sessions 10/9/18), HTN, gout, latent TB on INH, presented s/p 2 falls yesterday with progressive weakness, multiple falls, cough, chills, reported melena, found to have anemia, SUJATHA, severe hypokalemia with QTc prolongation.

## 2018-10-22 NOTE — PROGRESS NOTE ADULT - ASSESSMENT
Impression:  79yo M with gastric lymphoma on chemo presents with anemia, fatigue.    Problems:  1) Microcytic anemia - no signs of overt GI bleeding (brown stool on exam today and no BMs since admission, and Hgb has somewhat stabilized) - could be effect of chemo vs bleed (ie tumor bleed vs gastritis/esophagitis vs pud)  2) Cough, low grade temps - concern for infection, reactivation of latent TB  3) gastric lymphoma, on R-CHOP, first session 10/9    Recs:  - followup infectious evaluation (including TB evaluation)  - trend CBC, transfuse if Hgb <7  - PPI IV BID is fine  - ensure lytes replete  - clear liquid diet as tolerated from GI standpoint  - obtain outpatient endoscopy and oncology records for review   - if patient develops overt GI bleeding or progressive anemia can consider endoscopy at some point next week, assuming TB status is determined as well Impression:  79yo M with gastric lymphoma on chemo presents with anemia, fatigue.    Problems:  1) Microcytic anemia - no signs of overt GI bleeding (brown stool on exam today and no BMs since admission, and Hgb has somewhat stabilized) - could be effect of chemo vs bleed (ie tumor bleed vs gastritis/esophagitis vs pud)  2) Cough, low grade temps - concern for infection, reactivation of latent TB  3) gastric lymphoma, on R-CHOP, first session 10/9    Recs:  - followup infectious evaluation (including TB evaluation)  - trend CBC, transfuse if Hgb <7  - PPI IV BID is fine  - ensure lytes replete  - clear liquid diet as tolerated from GI standpoint  - obtain outpatient endoscopy and oncology records for review   - Only if patient develops overt GI bleeding or progressive anemia can consider endoscopy

## 2018-10-23 DIAGNOSIS — I95.9 HYPOTENSION, UNSPECIFIED: ICD-10-CM

## 2018-10-23 LAB
ANION GAP SERPL CALC-SCNC: 10 MMOL/L — SIGNIFICANT CHANGE UP (ref 5–17)
BUN SERPL-MCNC: 7 MG/DL — SIGNIFICANT CHANGE UP (ref 7–23)
CALCIUM SERPL-MCNC: 8.5 MG/DL — SIGNIFICANT CHANGE UP (ref 8.4–10.5)
CHLORIDE SERPL-SCNC: 101 MMOL/L — SIGNIFICANT CHANGE UP (ref 96–108)
CO2 SERPL-SCNC: 27 MMOL/L — SIGNIFICANT CHANGE UP (ref 22–31)
CREAT SERPL-MCNC: 1.01 MG/DL — SIGNIFICANT CHANGE UP (ref 0.5–1.3)
GLUCOSE SERPL-MCNC: 120 MG/DL — HIGH (ref 70–99)
HCT VFR BLD CALC: 23.1 % — LOW (ref 39–50)
HCT VFR BLD CALC: 24.3 % — LOW (ref 39–50)
HGB BLD-MCNC: 7.4 G/DL — LOW (ref 13–17)
HGB BLD-MCNC: 7.8 G/DL — LOW (ref 13–17)
MCHC RBC-ENTMCNC: 21.6 PG — LOW (ref 27–34)
MCHC RBC-ENTMCNC: 22 PG — LOW (ref 27–34)
MCHC RBC-ENTMCNC: 31.9 GM/DL — LOW (ref 32–36)
MCHC RBC-ENTMCNC: 32.1 GM/DL — SIGNIFICANT CHANGE UP (ref 32–36)
MCV RBC AUTO: 67.3 FL — LOW (ref 80–100)
MCV RBC AUTO: 68.8 FL — LOW (ref 80–100)
PLATELET # BLD AUTO: 303 K/UL — SIGNIFICANT CHANGE UP (ref 150–400)
PLATELET # BLD AUTO: 334 K/UL — SIGNIFICANT CHANGE UP (ref 150–400)
POTASSIUM SERPL-MCNC: 3.6 MMOL/L — SIGNIFICANT CHANGE UP (ref 3.5–5.3)
POTASSIUM SERPL-SCNC: 3.6 MMOL/L — SIGNIFICANT CHANGE UP (ref 3.5–5.3)
RBC # BLD: 3.36 M/UL — LOW (ref 4.2–5.8)
RBC # BLD: 3.61 M/UL — LOW (ref 4.2–5.8)
RBC # FLD: 14.9 % — HIGH (ref 10.3–14.5)
RBC # FLD: 15.9 % — HIGH (ref 10.3–14.5)
SODIUM SERPL-SCNC: 138 MMOL/L — SIGNIFICANT CHANGE UP (ref 135–145)
WBC # BLD: 7.72 K/UL — SIGNIFICANT CHANGE UP (ref 3.8–10.5)
WBC # BLD: 9.6 K/UL — SIGNIFICANT CHANGE UP (ref 3.8–10.5)
WBC # FLD AUTO: 7.72 K/UL — SIGNIFICANT CHANGE UP (ref 3.8–10.5)
WBC # FLD AUTO: 9.6 K/UL — SIGNIFICANT CHANGE UP (ref 3.8–10.5)

## 2018-10-23 PROCEDURE — 99233 SBSQ HOSP IP/OBS HIGH 50: CPT

## 2018-10-23 PROCEDURE — 93010 ELECTROCARDIOGRAM REPORT: CPT

## 2018-10-23 RX ORDER — LORATADINE 10 MG/1
10 TABLET ORAL DAILY
Qty: 0 | Refills: 0 | Status: DISCONTINUED | OUTPATIENT
Start: 2018-10-23 | End: 2018-10-25

## 2018-10-23 RX ORDER — ALBUTEROL 90 UG/1
2 AEROSOL, METERED ORAL EVERY 6 HOURS
Qty: 0 | Refills: 0 | Status: DISCONTINUED | OUTPATIENT
Start: 2018-10-23 | End: 2018-10-25

## 2018-10-23 RX ORDER — SODIUM CHLORIDE 9 MG/ML
1000 INJECTION INTRAMUSCULAR; INTRAVENOUS; SUBCUTANEOUS
Qty: 0 | Refills: 0 | Status: DISCONTINUED | OUTPATIENT
Start: 2018-10-23 | End: 2018-10-25

## 2018-10-23 RX ORDER — SODIUM CHLORIDE 9 MG/ML
500 INJECTION INTRAMUSCULAR; INTRAVENOUS; SUBCUTANEOUS ONCE
Qty: 0 | Refills: 0 | Status: COMPLETED | OUTPATIENT
Start: 2018-10-23 | End: 2018-10-23

## 2018-10-23 RX ADMIN — SODIUM CHLORIDE 100 MILLILITER(S): 9 INJECTION INTRAMUSCULAR; INTRAVENOUS; SUBCUTANEOUS at 14:35

## 2018-10-23 RX ADMIN — Medication 600 MILLIGRAM(S): at 17:33

## 2018-10-23 RX ADMIN — Medication 100 MILLIGRAM(S): at 12:09

## 2018-10-23 RX ADMIN — Medication 50 MILLIGRAM(S): at 12:09

## 2018-10-23 RX ADMIN — Medication 300 MILLIGRAM(S): at 12:09

## 2018-10-23 RX ADMIN — Medication 600 MILLIGRAM(S): at 05:41

## 2018-10-23 RX ADMIN — ENOXAPARIN SODIUM 40 MILLIGRAM(S): 100 INJECTION SUBCUTANEOUS at 12:08

## 2018-10-23 RX ADMIN — SODIUM CHLORIDE 2000 MILLILITER(S): 9 INJECTION INTRAMUSCULAR; INTRAVENOUS; SUBCUTANEOUS at 10:57

## 2018-10-23 RX ADMIN — Medication 100 MILLIGRAM(S): at 21:10

## 2018-10-23 RX ADMIN — PANTOPRAZOLE SODIUM 40 MILLIGRAM(S): 20 TABLET, DELAYED RELEASE ORAL at 05:42

## 2018-10-23 RX ADMIN — Medication 100 MILLIGRAM(S): at 05:41

## 2018-10-23 RX ADMIN — PANTOPRAZOLE SODIUM 40 MILLIGRAM(S): 20 TABLET, DELAYED RELEASE ORAL at 17:33

## 2018-10-23 NOTE — PROGRESS NOTE ADULT - PROBLEM SELECTOR PLAN 5
Likely secondary to hypokalemia, resolved.   Repeat EKG from 10/22 NSR w/ QTc 481. Resolved with supplementation.

## 2018-10-23 NOTE — CONSULT NOTE ADULT - ASSESSMENT
78 year old male with ingrown toenail, left hallux  -Debridement/slant back performed to the ingrowing nail   -No open lesions or signs of infection  -Pain resolved s/p removal of offending nail  -No other acute podiatric issues at this time  -Please reconsult as needed    Follow up with Dr. Mata CORONA within 1 month of discharge.  Call for appointment   [place] office: [phone]  [place] office: [phone]

## 2018-10-23 NOTE — SWALLOW BEDSIDE ASSESSMENT ADULT - COMMENTS
. Patient did not lose consciousness with fall however. No chest pain. + lower extremity edema x 1 day with L>R, has not happened before. No calf tenderness or pain. No pleuritic chest pain. Problem: Gastrointestinal hemorrhage with melena.  Plan: Pt with GI bleed with multiple episodes of melanotic diarrhea now with acute blood loss anemia as well as severe weakness causing fall x 2 episodes; Protonix 40mg IV stat then q12h; CT A/P with oral contrast given other symptoms; GI consult in AM.  Problem: Acute blood loss anemia.  Plan: Likely 2/2 melena from likely upper GI bleed related to GALT on chemotherapy; Transfuse PRN.  Problem: SUJATHA (acute kidney injury).  Plan: Pt with SUJATHA likely 2/2 dehydration from recurrent vomiting and diarrhea; IVF support while temporarily NPO pending repeat CBC and CT. Problem: QT prolongation.  Plan: 2/2 hypokalemia. Problem: Hypokalemia.  Plan: 2/2 vomiting and diarrhea. Problem: Nausea and vomiting, intractability of vomiting not specified, unspecified vomiting type. Plan: Unclear if 2/2 GI bleed vs GALT s/p chemo; Unable to give Zofran or Reglan due to significant QT prolongation; Start ativan IV PRN for nausea/vomiting for now; Replete K+ and should repeat EKG when K+ normalized to see if QT prolongation resolved and may be OK to start zofran. Problem: Cough.  Plan: CT Chest to evaluate cause; Does have hx of latent TB though no obvious cavitating lesions on CXR. CT A/P: No acute findings identified; No evidence of acute infiltrates; Despite the history gastric lymphoma no definite focal gastric abnormality can be seen nor is there definable adenopathy. As per GI: Plan: Infectious workup as per primary team; Okay with clear liquid diet; IV PPI BID is reasonable; No role for EGD at this time.

## 2018-10-23 NOTE — SWALLOW BEDSIDE ASSESSMENT ADULT - SLP PERTINENT HISTORY OF CURRENT PROBLEM
78M hx of gastric lymphoma on chemo, last chemotherapy sessions 10/9, HTN, gout, latent TB on INH, now presenting s/p 2 falls yesterday (10/19) with progressive weakness over the last week. Patient has had progressive weakness after recent chemotherapy. However, has also had severe persistent coughing that is nonproductive x 10 days. No fevers but some chills at home. Patient also noticed that he has now been having dark melanotic stools including some episodes of watery tar-black diarrhea including 2 episodes today. Is also very nauseous and vomiting up food he takes. Because of nausea he has had very poor po intake. Patient reports feeling very weak and also felt lightheaded after vomiting and then had a fall. He then had another episode of fall while walking in the bathroom. On second episode patient struck his head and was noted by his daughter to be mildly confused afterwards but this rapidly resolved.

## 2018-10-23 NOTE — PROGRESS NOTE ADULT - PROBLEM SELECTOR PLAN 4
Resolved with supplementation. - CT Chest with Small cystic area right upper lobe concern for reactivation of latent TB, pulm not concerned.   - cont to tx latent TB.  - x 1 month. will get speech/swallow eval to look for objective issues w/ swallowing, has been eating mashed potatoes and carrots at home presumably 2/2 problems swallowing.   - no new meds.  - on ppi bid  - no wheezing to suggest asthma though 1 month co-incides w/ fall, ?allergy/asthma.   - initiate claritin daily, albuterol prn to see if effective.

## 2018-10-23 NOTE — PROGRESS NOTE ADULT - PROBLEM SELECTOR PLAN 2
Resolved s/p IVF. - w/ tachycardia this AM. Otherwise non tachypneic, satting well on RA, non toxic appearing, rapidly improved.   - improved to 96/60 w/ fluids and time.   - vasovagal vs. GIB. Latter less likely w/ immediately resolved tachycardia, and brown stool (not black or red).   - repeat CBC this afternoon  - s/p 500 cc bolus, give another 100 cc/hr x 5 hrs.   - afebrile no leukocytosis, cont to monitor off abx.

## 2018-10-23 NOTE — PROGRESS NOTE ADULT - PROBLEM SELECTOR PLAN 1
Pt with multiple episodes of melanotic diarrhea at home, brown stool here.   CT A/P with no concerning findings.  Protonix 40mg IVq12.  Contacting outpatient GI to obtain EGD report.  Iron studies noted, most c/w anemia of chronic disease.  Trend CBC.  Case d/w Dr. Noyola (GI). Pt with multiple episodes of melanotic diarrhea at home, brown stool here.   This AM again w/ brown stool.   CT A/P with no concerning findings.  Protonix 40mg IVq12.  Iron studies noted, most c/w anemia of chronic disease.  Trend CBC, repeat today at 1 PM. If drop in HGB will call GI.

## 2018-10-23 NOTE — PROGRESS NOTE ADULT - PROBLEM SELECTOR PLAN 6
Mild today.  LE dopplers- negative  Podiatry consult for ingrown toenail by medicine prior. Likely secondary to hypokalemia, resolved.   Repeat EKG from 10/22 NSR w/ QTc 481.

## 2018-10-23 NOTE — CONSULT NOTE ADULT - SUBJECTIVE AND OBJECTIVE BOX
HPI:  78M hx of gastric lymphoma on chemo, last chemotherapy sessions 10/9, HTN, gout, latent TB on INH, now presenting s/p 2 falls yesterday with progressive weakness over the last week. Patient has had progressive weakness after recent chemotherapy. However, has also had severe persistent coughing that is nonproductive x 10 days. No fevers but some chills at home. Patient also noticed that he has now been having dark melanotic stools including some episodes of watery tar-black diarrhea including 2 episodes today. Is also very nauseous and vomiting up food he takes. Because of nausea he has had very poor po intake. Patient reports feeling very weak and also felt lightheaded after vomiting and then had a fall. He then had another episode of fall while walking in the bathroom. On second episode patient struck his head and was noted by his daughter to be mildly confused afterwards but this rapidly resolved. Patient did not lose consciousness with fall however. No chest pain. + lower extremity edema x 1 day with L>R, has not happened before. No calf tenderness or pain. No pleuritic chest pain. (20 Oct 2018 03:34)    Patient is a 78y old  Male who presents with a chief complaint of Severe hypokalemia/Prolonged QTc interval/GI bleed w/melena/Nausea/vomiting/diarrhea/coughing/weakness and fall with head strike (23 Oct 2018 10:00)    Allergies    No Known Allergies    Intolerances      Vital Signs Last 24 Hrs  T(C): 37 (23 Oct 2018 13:15), Max: 37 (23 Oct 2018 13:15)  T(F): 98.6 (23 Oct 2018 13:15), Max: 98.6 (23 Oct 2018 13:15)  HR: 89 (23 Oct 2018 13:15) (84 - 110)  BP: 96/60 (23 Oct 2018 13:15) (80/48 - 143/67)  BP(mean): --  RR: 18 (23 Oct 2018 13:15) (18 - 18)  SpO2: 98% (23 Oct 2018 13:15) (98% - 99%)                        7.4    9.6   )-----------( 303      ( 23 Oct 2018 13:25 )             23.1     10-23    138  |  101  |  7   ----------------------------<  120<H>  3.6   |  27  |  1.01    Ca    8.5      23 Oct 2018 06:41      CAPILLARY BLOOD GLUCOSE        MEDICATIONS  (STANDING):  benzonatate 100 milliGRAM(s) Oral every 8 hours  enoxaparin Injectable 40 milliGRAM(s) SubCutaneous daily  guaiFENesin  milliGRAM(s) Oral every 12 hours  influenza   Vaccine 0.5 milliLiter(s) IntraMuscular once  isoniazid 300 milliGRAM(s) Oral daily  pantoprazole  Injectable 40 milliGRAM(s) IV Push two times a day  pyridoxine 50 milliGRAM(s) Oral daily  sodium chloride 0.9%. 1000 milliLiter(s) (50 mL/Hr) IV Continuous <Continuous>    MEDICATIONS  (PRN):  LORazepam   Injectable 0.5 milliGRAM(s) IV Push every 6 hours PRN Nausea and/or Vomiting    PAST MEDICAL & SURGICAL HISTORY:  Gout  TB (tuberculosis)  HTN (hypertension)  Gastric lymphoma  No significant past surgical history    MIGUEL ANGEL:  Ingrowing hallux nail, left foot without any local signs of infection  +Pain on palpation and minimal edema  No open lesions or erythema  Pedal pulses 2/4 palpable bilaterally  Sensation intact to b/l LE  No gross deformities

## 2018-10-23 NOTE — PROGRESS NOTE ADULT - PROBLEM SELECTOR PLAN 3
- CT Chest with Small cystic area right upper lobe concern for reactivation of latent TB, pulm not concerned.   - cont to tx latent TB. Resolved s/p IVF.

## 2018-10-23 NOTE — SWALLOW BEDSIDE ASSESSMENT ADULT - SWALLOW EVAL: DIAGNOSIS
Consult for bedside swallow evaluation received and appreciated.  Chart reviewed and events noted.  As per discussion with BARBARA Funes, swallow evaluation no longer indicated at this time.  This service to sign off and will not actively follow.  Please re-consult as needed.

## 2018-10-23 NOTE — SWALLOW BEDSIDE ASSESSMENT ADULT - ADDITIONAL RECOMMENDATIONS
hx cont'd: Pulmonary consulted for RUL pulmonary cyst and reported “Chest CT shows thinwalled cyst in the RUL as well as in the MIRA.  There is no opacity to suggest pneumonia and cyst is thinwalled and not a cavity.  Patient was an active smoker years ago.  This is not consistent with active tuberculosis.  Would continue treatment for latent TB infection with INH and D/C respiratory isolation. Patient also reports persistent dry cough, which is likely upper airway cough syndrome.  HIs lungs are clear wihtout wheezing.  He is also on an AARB which can also cause cough.  Consider treatment with nasal steroid and H2 blocker for GERD.”

## 2018-10-23 NOTE — PROGRESS NOTE ADULT - ASSESSMENT
Mr. Russo is a 78-year-old cantonese speaking man with PMH of gastric lymphoma on chemo (last chemotherapy sessions 10/9/18), HTN, gout, latent TB on INH, presented s/p 2 falls with progressive weakness, multiple falls, cough, chills, reported melena, found to have anemia, SUJATHA, severe hypokalemia with QTc prolongation, monitoring on tele. Now potassium normalized, HD stable afebrile w/ no leukocytosis.     W/ microcytic anemia he likely has iron deficiency anemia on top of ACD (ferritin 1000). Needs GI scope which will occur as outpatient in the absence of e/o clinical bleeding at this time as per GI. Invetsigation into cough and frailty continues. Mr. 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 Now potassium normalized, HD stable afebrile w/ no leukocytosis.     With microcytic anemia he likely has iron deficiency anemia on top of ACD (ferritin 1000). Needs GI scope which will occur as outpatient in the absence of e/o clinical bleeding at this time as per GI. This AM w/ bowel movement c/b hypotension 80/40 and tachycardia 140 NSR most c/w vasovagal episode (no syncope), hadn't moved bowels in several days and was straining quite a bit. Not tarry black stool though dark brown. Tachycardia rapidly resolved w/ laying down, received 500 cc bolus w/ improvement in pressures up to 96/60, around baseline 100 systolic. Intermittent readings up to 140-150. Will repeat CBC early this afternoon (pending), if drop will re-call GI for inpatient scope. May be component of hemodilution w/ fluids, too. On exam patient was asymptomatic, resolved tachycardia, comfortable and conversational. Continue to monitor, will give more fluids.      Ultimately patient's recent decompensation is temporally correlated w/ chemo. D/w patient and daughter, w/ help of cantonese  Fe, at length. They need to weigh risks/benefits of continuing going forward.

## 2018-10-23 NOTE — PROGRESS NOTE ADULT - PROBLEM SELECTOR PLAN 7
Will cont Lovenox for DVT prophylaxis (medicine d/w Dr. Noyola). Mild today.  LE dopplers- negative  Podiatry consult for ingrown toenail by medicine prior.

## 2018-10-23 NOTE — PROGRESS NOTE ADULT - SUBJECTIVE AND OBJECTIVE BOX
cc: cough, weakness.    Overnight w/ out acute events. HD stable afebrile, NSR 70-80.   This AM while in bathroom rates up to 120s-150s NSR on telemetry, now back to 90s. EKG/vitals pending.      REVIEW OF SYSTEMS:  CONSTITUTIONAL: No fever, weight loss, or fatigue  EYES: No eye pain, visual disturbances, or discharge  ENMT:  No difficulty hearing, tinnitus, vertigo; No sinus or throat pain  NECK: No pain or stiffness  RESPIRATORY: No cough, wheezing, chills or hemoptysis; No shortness of breath  CARDIOVASCULAR: No chest pain, palpitations, dizziness, or leg swelling  GASTROINTESTINAL: No abdominal or epigastric pain. No nausea, vomiting, or hematemesis; No diarrhea or constipation. No melena or hematochezia.  GENITOURINARY: No dysuria, frequency, hematuria, or incontinence  NEUROLOGICAL: No headaches, memory loss, loss of strength, numbness, or tremors  SKIN: No itching, burning, rashes, or lesions   ENDOCRINE: No heat or cold intolerance; No hair loss  MUSCULOSKELETAL: No joint pain or swelling; No muscle, back, or extremity pain  PSYCHIATRIC: No depression, anxiety, mood swings, or difficulty sleeping  HEME/LYMPH: No easy bruising, or bleeding gums    T(C): 36.7 (10-23-18 @ 04:57), Max: 37.1 (10-22-18 @ 12:17)  HR: 84 (10-23-18 @ 04:57) (84 - 88)  BP: 143/67 (10-23-18 @ 04:57) (93/60 - 143/67)  RR: 18 (10-23-18 @ 04:57) (18 - 18)  SpO2: 99% (10-23-18 @ 04:57) (98% - 99%)  Wt(kg): --Vital Signs Last 24 Hrs  T(C): 36.7 (23 Oct 2018 04:57), Max: 37.1 (22 Oct 2018 12:17)  T(F): 98 (23 Oct 2018 04:57), Max: 98.8 (22 Oct 2018 12:17)  HR: 84 (23 Oct 2018 04:57) (84 - 88)  BP: 143/67 (23 Oct 2018 04:57) (93/60 - 143/67)  BP(mean): --  RR: 18 (23 Oct 2018 04:57) (18 - 18)  SpO2: 99% (23 Oct 2018 04:57) (98% - 99%)    PHYSICAL EXAM:  GENERAL: NAD, well-groomed  HEAD:  Atraumatic, Normocephalic  EYES: EOMI, conjunctiva and sclera clear  NECK: Supple, No JVD  NERVOUS SYSTEM:  Alert & Oriented X3, Good concentration; Motor Strength 5/5 B/L upper and lower extremities  CHEST/LUNG: Clear to percussion bilaterally; No rales, rhonchi, wheezing  HEART: Regular rate and rhythm; No murmurs  ABDOMEN: Soft, Nontender, Nondistended; Bowel sounds present  EXTREMITIES:  2+ Peripheral Pulses, No clubbing, cyanosis, or edema  SKIN: No rashes or lesions    Consultant(s) Notes Reviewed:  [x ] YES  [ ] NO    LABS:  no leukocytosis, stable hgb in 7 w/ microcytosis, stable plt.    BMP WNL, iron labs c/w ACD.                  7.8    7.72  )-----------( 334      ( 23 Oct 2018 08:07 )             24.3     10-23    138  |  101  |  7   ----------------------------<  120<H>  3.6   |  27  |  1.01    Ca    8.5      23 Oct 2018 06:41      CAPILLARY BLOOD GLUCOSE    RADIOLOGY & ADDITIONAL TESTS:  Imaging Personally Reviewed: yes   Care discussed w/ other providers: yes--  Consultant notes reviewed: yes    CT ch/ab/p:   CHEST:     LUNGS AND LARGE AIRWAYS: Patent central airways. No pulmonary nodules.   Small cystic area right upper lobe  PLEURA: No pleural effusion.  VESSELS: Within normal limits.  HEART: Heart size is normal. No pericardial effusion.  MEDIASTINUM AND FRANCESCO: No lymphadenopathy.  CHEST WALL AND LOWER NECK: Within normal limits.    ABDOMEN AND PELVIS:  LIVER: Within normal limits.  BILE DUCTS: Normal caliber.  GALLBLADDER: Within normal limits.  SPLEEN: Within normal limits.  PANCREAS: Within normal limits.  ADRENALS: Within normal limits.  KIDNEYS/URETERS: 4 cm right renal cyst. No hydronephrosis or calculi    BLADDER: Within normal limits.  REPRODUCTIVE ORGANS: Prostate is mildly enlarged measuring 4.0 x 4.9 cm    BOWEL: No definite gastric thickening to be seen. The remainder the bowel   is unremarkable         PERITONEUM: No ascites.  VESSELS:  Within normal limits.  RETROPERITONEUM: No lymphadenopathy.    ABDOMINAL WALL: Within normal limits.  BONES: Mild degenerative changes    IMPRESSION:     No acute findings identified.    No evidence of acute infiltrates.    Despite the history gastric lymphoma no definite focal gastric   abnormality can be seen nor is there definable adenopathy.    CTH WNL.     Duplex LE: no DVT. cc: cough, weakness.    Overnight w/ out acute events. HD stable afebrile, NSR 70-80.   This AM while in bathroom rates up to 120s-150s NSR on telemetry, now back to 90s. EKG/vitals pending.      REVIEW OF SYSTEMS:  CONSTITUTIONAL: No fever, + weight loss, ++ fatigue  EYES: No eye pain, visual disturbances, or discharge  ENMT:  No difficulty hearing, tinnitus, vertigo; No sinus or throat pain  NECK: No pain or stiffness  RESPIRATORY: + cough, no wheezing, chills or hemoptysis; + shortness of breath w/ coughing fits.  CARDIOVASCULAR: No chest pain, palpitations, dizziness, or leg swelling  GASTROINTESTINAL: No abdominal or epigastric pain. No nausea, vomiting, or hematemesis; + diarrhea, no constipation. +?melena no hematochezia.  GENITOURINARY: No dysuria, frequency, hematuria, or incontinence  NEUROLOGICAL: No headaches, memory loss, + loss of strength, no numbness, or tremors  SKIN: No itching, burning, rashes, or lesions   ENDOCRINE: No heat or cold intolerance; No hair loss  MUSCULOSKELETAL: No joint pain or swelling; No muscle, back, or extremity pain  PSYCHIATRIC: No depression, anxiety, mood swings, or difficulty sleeping  HEME/LYMPH: No easy bruising, or bleeding gums    T(C): 36.7 (10-23-18 @ 04:57), Max: 37.1 (10-22-18 @ 12:17)  HR: 84 (10-23-18 @ 04:57) (84 - 88)  BP: 143/67 (10-23-18 @ 04:57) (93/60 - 143/67)  RR: 18 (10-23-18 @ 04:57) (18 - 18)  SpO2: 99% (10-23-18 @ 04:57) (98% - 99%)  Wt(kg): --Vital Signs Last 24 Hrs  T(C): 36.7 (23 Oct 2018 04:57), Max: 37.1 (22 Oct 2018 12:17)  T(F): 98 (23 Oct 2018 04:57), Max: 98.8 (22 Oct 2018 12:17)  HR: 84 (23 Oct 2018 04:57) (84 - 88)  BP: 143/67 (23 Oct 2018 04:57) (93/60 - 143/67)  BP(mean): --  RR: 18 (23 Oct 2018 04:57) (18 - 18)  SpO2: 99% (23 Oct 2018 04:57) (98% - 99%)    PHYSICAL EXAM:  GENERAL: NAD, well-groomed, wearing glasses. Thin. Daughter by bedside. Nontoxic appearing.   HEAD:  Atraumatic, Normocephalic  EYES: EOMI, conjunctiva and sclera clear  NECK: Supple, No JVD  NERVOUS SYSTEM:  Alert & Oriented X3, Good concentration; Motor Strength 5/5 B/L upper and lower extremities  CHEST/LUNG: Clear to percussion bilaterally; No rales, rhonchi, wheezing  HEART: Regular rate and rhythm; No murmurs  ABDOMEN: Soft, Nontender, Nondistended; Bowel sounds present  EXTREMITIES:  2+ Peripheral Pulses, No clubbing, cyanosis, or edema  SKIN: No rashes or lesions    Consultant(s) Notes Reviewed:  [x ] YES  [ ] NO    LABS:  no leukocytosis, stable hgb in 7 w/ microcytosis, stable plt.    BMP WNL, iron labs c/w ACD.   *Repeat CBC pending.                  7.8    7.72  )-----------( 334      ( 23 Oct 2018 08:07 )             24.3     10-23    138  |  101  |  7   ----------------------------<  120<H>  3.6   |  27  |  1.01    Ca    8.5      23 Oct 2018 06:41      CAPILLARY BLOOD GLUCOSE    RADIOLOGY & ADDITIONAL TESTS:  Imaging Personally Reviewed: yes   Care discussed w/ other providers: yes--  Consultant notes reviewed: yes    CT ch/ab/p:   CHEST:     LUNGS AND LARGE AIRWAYS: Patent central airways. No pulmonary nodules.   Small cystic area right upper lobe  PLEURA: No pleural effusion.  VESSELS: Within normal limits.  HEART: Heart size is normal. No pericardial effusion.  MEDIASTINUM AND FRANCESCO: No lymphadenopathy.  CHEST WALL AND LOWER NECK: Within normal limits.    ABDOMEN AND PELVIS:  LIVER: Within normal limits.  BILE DUCTS: Normal caliber.  GALLBLADDER: Within normal limits.  SPLEEN: Within normal limits.  PANCREAS: Within normal limits.  ADRENALS: Within normal limits.  KIDNEYS/URETERS: 4 cm right renal cyst. No hydronephrosis or calculi    BLADDER: Within normal limits.  REPRODUCTIVE ORGANS: Prostate is mildly enlarged measuring 4.0 x 4.9 cm    BOWEL: No definite gastric thickening to be seen. The remainder the bowel   is unremarkable         PERITONEUM: No ascites.  VESSELS:  Within normal limits.  RETROPERITONEUM: No lymphadenopathy.    ABDOMINAL WALL: Within normal limits.  BONES: Mild degenerative changes    IMPRESSION:     No acute findings identified.    No evidence of acute infiltrates.    Despite the history gastric lymphoma no definite focal gastric   abnormality can be seen nor is there definable adenopathy.    CTH WNL.     Duplex LE: no DVT.

## 2018-10-24 ENCOUNTER — APPOINTMENT (OUTPATIENT)
Dept: RADIATION ONCOLOGY | Facility: CLINIC | Age: 78
End: 2018-10-24

## 2018-10-24 LAB
ANION GAP SERPL CALC-SCNC: 13 MMOL/L — SIGNIFICANT CHANGE UP (ref 5–17)
BASOPHILS # BLD AUTO: 0.04 K/UL — SIGNIFICANT CHANGE UP (ref 0–0.2)
BASOPHILS NFR BLD AUTO: 0.4 % — SIGNIFICANT CHANGE UP (ref 0–2)
BUN SERPL-MCNC: 6 MG/DL — LOW (ref 7–23)
CALCIUM SERPL-MCNC: 8.6 MG/DL — SIGNIFICANT CHANGE UP (ref 8.4–10.5)
CHLORIDE SERPL-SCNC: 101 MMOL/L — SIGNIFICANT CHANGE UP (ref 96–108)
CO2 SERPL-SCNC: 26 MMOL/L — SIGNIFICANT CHANGE UP (ref 22–31)
CREAT SERPL-MCNC: 1.08 MG/DL — SIGNIFICANT CHANGE UP (ref 0.5–1.3)
EOSINOPHIL # BLD AUTO: 0.03 K/UL — SIGNIFICANT CHANGE UP (ref 0–0.5)
EOSINOPHIL NFR BLD AUTO: 0.3 % — SIGNIFICANT CHANGE UP (ref 0–6)
GLUCOSE SERPL-MCNC: 108 MG/DL — HIGH (ref 70–99)
HCT VFR BLD CALC: 24.7 % — LOW (ref 39–50)
HGB BLD-MCNC: 8 G/DL — LOW (ref 13–17)
IMM GRANULOCYTES NFR BLD AUTO: 1.2 % — SIGNIFICANT CHANGE UP (ref 0–1.5)
LYMPHOCYTES # BLD AUTO: 0.94 K/UL — LOW (ref 1–3.3)
LYMPHOCYTES # BLD AUTO: 9.9 % — LOW (ref 13–44)
MCHC RBC-ENTMCNC: 22 PG — LOW (ref 27–34)
MCHC RBC-ENTMCNC: 32.4 GM/DL — SIGNIFICANT CHANGE UP (ref 32–36)
MCV RBC AUTO: 68 FL — LOW (ref 80–100)
MONOCYTES # BLD AUTO: 0.7 K/UL — SIGNIFICANT CHANGE UP (ref 0–0.9)
MONOCYTES NFR BLD AUTO: 7.4 % — SIGNIFICANT CHANGE UP (ref 2–14)
NEUTROPHILS # BLD AUTO: 7.63 K/UL — HIGH (ref 1.8–7.4)
NEUTROPHILS NFR BLD AUTO: 80.8 % — HIGH (ref 43–77)
PLATELET # BLD AUTO: 404 K/UL — HIGH (ref 150–400)
POTASSIUM SERPL-MCNC: 4 MMOL/L — SIGNIFICANT CHANGE UP (ref 3.5–5.3)
POTASSIUM SERPL-SCNC: 4 MMOL/L — SIGNIFICANT CHANGE UP (ref 3.5–5.3)
RBC # BLD: 3.63 M/UL — LOW (ref 4.2–5.8)
RBC # FLD: 16.4 % — HIGH (ref 10.3–14.5)
SODIUM SERPL-SCNC: 140 MMOL/L — SIGNIFICANT CHANGE UP (ref 135–145)
WBC # BLD: 9.45 K/UL — SIGNIFICANT CHANGE UP (ref 3.8–10.5)
WBC # FLD AUTO: 9.45 K/UL — SIGNIFICANT CHANGE UP (ref 3.8–10.5)

## 2018-10-24 PROCEDURE — 99232 SBSQ HOSP IP/OBS MODERATE 35: CPT

## 2018-10-24 RX ADMIN — ENOXAPARIN SODIUM 40 MILLIGRAM(S): 100 INJECTION SUBCUTANEOUS at 12:30

## 2018-10-24 RX ADMIN — Medication 600 MILLIGRAM(S): at 06:00

## 2018-10-24 RX ADMIN — Medication 600 MILLIGRAM(S): at 17:10

## 2018-10-24 RX ADMIN — Medication 300 MILLIGRAM(S): at 17:10

## 2018-10-24 RX ADMIN — Medication 100 MILLIGRAM(S): at 12:31

## 2018-10-24 RX ADMIN — Medication 50 MILLIGRAM(S): at 12:31

## 2018-10-24 RX ADMIN — PANTOPRAZOLE SODIUM 40 MILLIGRAM(S): 20 TABLET, DELAYED RELEASE ORAL at 17:10

## 2018-10-24 RX ADMIN — Medication 100 MILLIGRAM(S): at 21:32

## 2018-10-24 RX ADMIN — LORATADINE 10 MILLIGRAM(S): 10 TABLET ORAL at 12:30

## 2018-10-24 RX ADMIN — Medication 100 MILLIGRAM(S): at 06:00

## 2018-10-24 RX ADMIN — PANTOPRAZOLE SODIUM 40 MILLIGRAM(S): 20 TABLET, DELAYED RELEASE ORAL at 06:00

## 2018-10-24 NOTE — CHART NOTE - NSCHARTNOTEFT_GEN_A_CORE
Pt continues to have no melena or hematochezia. Had x1 brown BM per patient. Will hold of on endoscopic intervention at this time. Please call with questions.

## 2018-10-24 NOTE — PROGRESS NOTE ADULT - SUBJECTIVE AND OBJECTIVE BOX
cc: cough, weakness.    Overnight w/ out acute events. HD stable afebrile, NSR 70-90, 3 minute runs of tachycardia sinus up to 140s w/ bowel movements.    This AM feels well, asking to go home.   VSS, continuse to have brown stool.     REVIEW OF SYSTEMS:  CONSTITUTIONAL: No fever, + weight loss, ++ fatigue  EYES: No eye pain, visual disturbances, or discharge  ENMT:  No difficulty hearing, tinnitus, vertigo; No sinus or throat pain  NECK: No pain or stiffness  RESPIRATORY: + cough, no wheezing, chills or hemoptysis; + shortness of breath w/ coughing fits.  CARDIOVASCULAR: No chest pain, palpitations, dizziness, or leg swelling  GASTROINTESTINAL: No abdominal or epigastric pain. No nausea, vomiting, or hematemesis; + diarrhea, no constipation. +?melena no hematochezia.  GENITOURINARY: No dysuria, frequency, hematuria, or incontinence  NEUROLOGICAL: No headaches, memory loss, + loss of strength, no numbness, or tremors  SKIN: No itching, burning, rashes, or lesions   ENDOCRINE: No heat or cold intolerance; No hair loss  MUSCULOSKELETAL: No joint pain or swelling; No muscle, back, or extremity pain  PSYCHIATRIC: No depression, anxiety, mood swings, or difficulty sleeping  HEME/LYMPH: No easy bruising, or bleeding gums    Vital Signs Last 24 Hrs  T(C): 37 (24 Oct 2018 11:20), Max: 37 (23 Oct 2018 13:15)  T(F): 98.6 (24 Oct 2018 11:20), Max: 98.6 (23 Oct 2018 13:15)  HR: 77 (24 Oct 2018 11:20) (77 - 129)  BP: 98/63 (24 Oct 2018 09:47) (96/60 - 128/67)  BP(mean): --  RR: 18 (24 Oct 2018 11:20) (18 - 18)  SpO2: 96% (24 Oct 2018 11:20) (96% - 99%)    PHYSICAL EXAM:  GENERAL: NAD, well-groomed, wearing glasses. Thin. Daughter by bedside. Nontoxic appearing, up in chair today.   HEAD:  Atraumatic, Normocephalic  EYES: EOMI, conjunctiva and sclera clear  NECK: Supple, No JVD  NERVOUS SYSTEM:  Alert & Oriented X3, Good concentration; Motor Strength 5/5 B/L upper and lower extremities  CHEST/LUNG: Clear to percussion bilaterally; No rales, rhonchi, wheezing. Cough.   HEART: Regular rate and rhythm; No murmurs  ABDOMEN: Soft, Nontender, Nondistended; Bowel sounds present  EXTREMITIES:  2+ Peripheral Pulses, No clubbing, cyanosis, or edema  SKIN: No rashes or lesions    Consultant(s) Notes Reviewed:  [x ] YES  [ ] NO    LABS:  10/24: stable cbc/bmp, no leukocytosis.     no leukocytosis, stable hgb in 7 w/ microcytosis, stable plt.    BMP WNL, iron labs c/w ACD.   *Repeat CBC pending.                  7.8    7.72  )-----------( 334      ( 23 Oct 2018 08:07 )             24.3     10-23    138  |  101  |  7   ----------------------------<  120<H>  3.6   |  27  |  1.01    Ca    8.5      23 Oct 2018 06:41      CAPILLARY BLOOD GLUCOSE    RADIOLOGY & ADDITIONAL TESTS:  Imaging Personally Reviewed: yes   Care discussed w/ other providers: yes--  Consultant notes reviewed: yes    CT ch/ab/p:   CHEST:     LUNGS AND LARGE AIRWAYS: Patent central airways. No pulmonary nodules.   Small cystic area right upper lobe  PLEURA: No pleural effusion.  VESSELS: Within normal limits.  HEART: Heart size is normal. No pericardial effusion.  MEDIASTINUM AND FRANCESCO: No lymphadenopathy.  CHEST WALL AND LOWER NECK: Within normal limits.    ABDOMEN AND PELVIS:  LIVER: Within normal limits.  BILE DUCTS: Normal caliber.  GALLBLADDER: Within normal limits.  SPLEEN: Within normal limits.  PANCREAS: Within normal limits.  ADRENALS: Within normal limits.  KIDNEYS/URETERS: 4 cm right renal cyst. No hydronephrosis or calculi    BLADDER: Within normal limits.  REPRODUCTIVE ORGANS: Prostate is mildly enlarged measuring 4.0 x 4.9 cm    BOWEL: No definite gastric thickening to be seen. The remainder the bowel   is unremarkable         PERITONEUM: No ascites.  VESSELS:  Within normal limits.  RETROPERITONEUM: No lymphadenopathy.    ABDOMINAL WALL: Within normal limits.  BONES: Mild degenerative changes    IMPRESSION:     No acute findings identified.    No evidence of acute infiltrates.    Despite the history gastric lymphoma no definite focal gastric   abnormality can be seen nor is there definable adenopathy.    CTH WNL.     Duplex LE: no DVT.

## 2018-10-24 NOTE — PROGRESS NOTE ADULT - PROBLEM SELECTOR PLAN 4
- CT Chest with Small cystic area right upper lobe concern for reactivation of latent TB, pulm not concerned.   - cont to tx latent TB.  - x 1 month. will get speech/swallow eval to look for objective issues w/ swallowing, has been eating mashed potatoes and carrots at home presumably 2/2 problems swallowing.   - no new meds.  - on ppi bid  - no wheezing to suggest asthma though 1 month co-incides w/ fall, ?allergy/asthma.   - cont claritin daily, albuterol prn to see if effective.

## 2018-10-24 NOTE — PROGRESS NOTE ADULT - PROBLEM SELECTOR PLAN 1
Pt with multiple episodes of melanotic diarrhea at home, brown stool here.   This AM again w/ brown stool.   CT A/P with no concerning findings.  Protonix 40mg IVq12.  Iron studies noted, most c/w anemia of chronic disease.  HGB stable.

## 2018-10-24 NOTE — PROGRESS NOTE ADULT - PROBLEM SELECTOR PLAN 2
- resolved  - w/ tachycardia and hypotension 10/23 AM. Otherwise non tachypneic, satting well on RA, non toxic appearing, rapidly improved.   - improved to 120 ssystolic   - vasovagal   - repeat CBC stable  - s/p 1L  - afebrile no leukocytosis, cont to monitor off abx.

## 2018-10-24 NOTE — PROGRESS NOTE ADULT - ASSESSMENT
Mr. 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 Now potassium normalized, HD stable afebrile w/ no leukocytosis.     With microcytic anemia he likely has iron deficiency anemia on top of ACD (ferritin 1000). Will pursue GI scope as outpatient. Vasovagal episode yesterday resolved, HD stable and well appaering. PT today. Speech and swallow for reccs on diet moving forward. Ultimately patient's recent decompensation is temporally correlated w/ chemo. D/w patient and daughter, w/ help of cantonese  Fe, at length. They need to weigh risks/benefits of continuing going forward. Anticipate d/c most likely tmrw, d/w GI, scope w/ + H Pylori will begin tx and ensure close GI f/u. yes

## 2018-10-25 ENCOUNTER — TRANSCRIPTION ENCOUNTER (OUTPATIENT)
Age: 78
End: 2018-10-25

## 2018-10-25 VITALS
DIASTOLIC BLOOD PRESSURE: 54 MMHG | RESPIRATION RATE: 18 BRPM | OXYGEN SATURATION: 100 % | SYSTOLIC BLOOD PRESSURE: 95 MMHG | HEART RATE: 88 BPM | TEMPERATURE: 98 F

## 2018-10-25 LAB
ANION GAP SERPL CALC-SCNC: 12 MMOL/L — SIGNIFICANT CHANGE UP (ref 5–17)
BUN SERPL-MCNC: 8 MG/DL — SIGNIFICANT CHANGE UP (ref 7–23)
CALCIUM SERPL-MCNC: 8.9 MG/DL — SIGNIFICANT CHANGE UP (ref 8.4–10.5)
CHLORIDE SERPL-SCNC: 100 MMOL/L — SIGNIFICANT CHANGE UP (ref 96–108)
CO2 SERPL-SCNC: 27 MMOL/L — SIGNIFICANT CHANGE UP (ref 22–31)
CREAT SERPL-MCNC: 1.19 MG/DL — SIGNIFICANT CHANGE UP (ref 0.5–1.3)
GLUCOSE SERPL-MCNC: 113 MG/DL — HIGH (ref 70–99)
HCT VFR BLD CALC: 22.7 % — LOW (ref 39–50)
HGB BLD-MCNC: 7.4 G/DL — LOW (ref 13–17)
MCHC RBC-ENTMCNC: 21.2 PG — LOW (ref 27–34)
MCHC RBC-ENTMCNC: 32.6 GM/DL — SIGNIFICANT CHANGE UP (ref 32–36)
MCV RBC AUTO: 65 FL — LOW (ref 80–100)
PLATELET # BLD AUTO: 439 K/UL — HIGH (ref 150–400)
POTASSIUM SERPL-MCNC: 3.6 MMOL/L — SIGNIFICANT CHANGE UP (ref 3.5–5.3)
POTASSIUM SERPL-SCNC: 3.6 MMOL/L — SIGNIFICANT CHANGE UP (ref 3.5–5.3)
RBC # BLD: 3.49 M/UL — LOW (ref 4.2–5.8)
RBC # FLD: 16.4 % — HIGH (ref 10.3–14.5)
SODIUM SERPL-SCNC: 139 MMOL/L — SIGNIFICANT CHANGE UP (ref 135–145)
WBC # BLD: 10.58 K/UL — HIGH (ref 3.8–10.5)
WBC # FLD AUTO: 10.58 K/UL — HIGH (ref 3.8–10.5)

## 2018-10-25 PROCEDURE — 99239 HOSP IP/OBS DSCHRG MGMT >30: CPT

## 2018-10-25 RX ORDER — ALBUTEROL 90 UG/1
2 AEROSOL, METERED ORAL
Qty: 1 | Refills: 0
Start: 2018-10-25 | End: 2018-11-23

## 2018-10-25 RX ORDER — ACETAMINOPHEN 500 MG
325 TABLET ORAL EVERY 6 HOURS
Qty: 0 | Refills: 0 | Status: DISCONTINUED | OUTPATIENT
Start: 2018-10-25 | End: 2018-10-25

## 2018-10-25 RX ORDER — LORATADINE 10 MG/1
1 TABLET ORAL
Qty: 30 | Refills: 0
Start: 2018-10-25 | End: 2018-11-23

## 2018-10-25 RX ORDER — PANTOPRAZOLE SODIUM 20 MG/1
1 TABLET, DELAYED RELEASE ORAL
Qty: 30 | Refills: 0
Start: 2018-10-25 | End: 2018-11-23

## 2018-10-25 RX ADMIN — LORATADINE 10 MILLIGRAM(S): 10 TABLET ORAL at 13:41

## 2018-10-25 RX ADMIN — INFLUENZA VIRUS VACCINE 0.5 MILLILITER(S): 15; 15; 15; 15 SUSPENSION INTRAMUSCULAR at 13:40

## 2018-10-25 RX ADMIN — Medication 300 MILLIGRAM(S): at 13:41

## 2018-10-25 RX ADMIN — Medication 100 MILLIGRAM(S): at 13:41

## 2018-10-25 RX ADMIN — Medication 600 MILLIGRAM(S): at 05:02

## 2018-10-25 RX ADMIN — Medication 100 MILLIGRAM(S): at 05:02

## 2018-10-25 RX ADMIN — PANTOPRAZOLE SODIUM 40 MILLIGRAM(S): 20 TABLET, DELAYED RELEASE ORAL at 05:02

## 2018-10-25 RX ADMIN — Medication 50 MILLIGRAM(S): at 13:41

## 2018-10-25 NOTE — DISCHARGE NOTE ADULT - CARE PLAN
Principal Discharge DX:	Gastrointestinal hemorrhage, unspecified gastrointestinal hemorrhage type  Goal:	Hemoglobin and hematocrit stable. no acute bleeding.  Assessment and plan of treatment:	GIB w/ hypotension.  Multiple episodes of melanotic diarrhea at home, brown stool here.   CT A/P with no concerning findings.  s/p 1L iv fluid. BP improved.  Continue with Protonix as directed.  Iron studies noted, most c/w anemia of chronic disease.  There are 2 common types of GI Bleed, Upper GI Bleed and Lower GI Bleed.  Upper GI Bleed affects the esophagus, stomach, and first part of the small intestine. Lower GI Bleed affects the colon and rectum.  Upper GI Bleed signs and symptoms to notify your Health Care Provider are vomiting blood, or coffee ground vomitus, and bowel movements that look like black tar.  Lower GI Bleed signs and symptoms to notify your health care provider are bright red bloody bowel movements.   Take your medications as prescribed by your Gastroenterologist.  If you have had an Endoscopy or Colonoscopy, follow up with your Gastroenterologist for Pathology results.  Avoid NSAIDs unless your Health Care Provider tells you that it is ok (Aspirin, Ibuprofen, Advil, Motrin, Aleve).  Follow up with your Gastroenterologist within 1-2 weeks of discharge.  Secondary Diagnosis:	SUJATHA (acute kidney injury)  Assessment and plan of treatment:	Avoid taking (NSAIDs) - (ex: Ibuprofen, Advil, Celebrex, Naprosyn)  Avoid taking any nephrotoxic agents (can harm kidneys) - Intravenous contrast for diagnostic testing, combination cold medications.  Have all medications adjusted for your renal function by your Health Care Provider.  Blood pressure control is important.  Take all medication as prescribed.  Secondary Diagnosis:	Lower extremity edema  Assessment and plan of treatment:	LE dopplers- negative  stable.  Secondary Diagnosis:	TB (tuberculosis)  Assessment and plan of treatment:	CT Chest with Small cystic area right upper lobe concern for reactivation of latent TB.   Continue with TB treatment.  Secondary Diagnosis:	Cough  Assessment and plan of treatment:	Continue Claritin as directed.  Secondary Diagnosis:	Hypokalemia  Assessment and plan of treatment:	resolved.  Secondary Diagnosis:	QT prolongation  Assessment and plan of treatment:	Likely secondary to hypokalemia, resolved.   Repeat EKG from 10/22 NSR w/ QTc 481. Principal Discharge DX:	Gastrointestinal hemorrhage, unspecified gastrointestinal hemorrhage type  Goal:	Hemoglobin and hematocrit stable. no acute bleeding.  Assessment and plan of treatment:	GIB w/ hypotension.  Multiple episodes of melanotic diarrhea at home, brown stool here.   CT A/P with no concerning findings.  s/p 1L iv fluid. BP improved.  Continue with Protonix as directed.  Iron studies noted, most c/w anemia of chronic disease.  There are 2 common types of GI Bleed, Upper GI Bleed and Lower GI Bleed.  Upper GI Bleed affects the esophagus, stomach, and first part of the small intestine. Lower GI Bleed affects the colon and rectum.  Upper GI Bleed signs and symptoms to notify your Health Care Provider are vomiting blood, or coffee ground vomitus, and bowel movements that look like black tar.  Lower GI Bleed signs and symptoms to notify your health care provider are bright red bloody bowel movements.   Take your medications as prescribed by your Gastroenterologist.  If you have had an Endoscopy or Colonoscopy, follow up with your Gastroenterologist for Pathology results.  Avoid NSAIDs unless your Health Care Provider tells you that it is ok (Aspirin, Ibuprofen, Advil, Motrin, Aleve).  Follow up with your Gastroenterologist within 1-2 weeks of discharge.  Secondary Diagnosis:	SUJATHA (acute kidney injury)  Assessment and plan of treatment:	Avoid taking (NSAIDs) - (ex: Ibuprofen, Advil, Celebrex, Naprosyn)  Avoid taking any nephrotoxic agents (can harm kidneys) - Intravenous contrast for diagnostic testing, combination cold medications.  Have all medications adjusted for your renal function by your Health Care Provider.  Blood pressure control is important.  Take all medication as prescribed.  Secondary Diagnosis:	Lower extremity edema  Assessment and plan of treatment:	LE dopplers- negative  stable.  Secondary Diagnosis:	TB (tuberculosis)  Assessment and plan of treatment:	CT Chest with Small cystic area right upper lobe concern for reactivation of latent TB.   Continue with TB treatment.  Secondary Diagnosis:	Cough  Assessment and plan of treatment:	Continue Claritin and robitussin as directed.  Secondary Diagnosis:	Hypokalemia  Assessment and plan of treatment:	resolved.  Secondary Diagnosis:	QT prolongation  Assessment and plan of treatment:	Likely secondary to hypokalemia, resolved.   Repeat EKG from 10/22 NSR w/ QTc 481.

## 2018-10-25 NOTE — DISCHARGE NOTE ADULT - HOSPITAL COURSE
to be completed. Mr. 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     Found to have anemia, SUJATHA, severe hypokalemia with QTc prolongation, monitored on tele. Potassium rapidly normalized w/ repletion and hydration, SUJATHA resolved. Subjective improvement. Tele was only significant for intermittent runs of sinus tachycardia while patient was straining on the toilet or ambulating around the floor. HD improved w/ hydration though he'd continue to have episodes of intermittent hypotension, would improve w/ laying down. Not correlated w/ orthostatic symptoms, PT eval and recc home w/ home PT. GI eval w/ report of melena and anemia, no bleeding in house, will defer GI scope to outpatient. GI reviewed prior scope and noted H Pylori, I reached out to home GI doc who relayed that he treated Mr. Russo for his h pylori already.     Ultimately patient's recent decompensation is temporally correlated w/ chemo. D/w patient and daughter, w/ help of cantonese  Fe, at length. They need to weigh risks/benefits of continuing going forward. Patient w/ chronic cough x 1 month, CT chest unrevealing other than RUL cyst: pulm not concerned for active TB. Treated empirically w/ anti-histamine and albuterol with improvement though cough continued. No gross aspiration on food, already eats pureed foods at home. Can continue eval for chronic cough at home. High risk for RTC w/ frailty on chemotherapy and labile HR/pressures w/ position.

## 2018-10-25 NOTE — PROGRESS NOTE ADULT - PROBLEM SELECTOR PLAN 1
Pt with multiple episodes of melanotic diarrhea at home, brown stool here.   CT A/P with no concerning findings.  Protonix 40mg IVq12 to protonix 40 PO on discharge.   Iron studies noted, most c/w anemia of chronic disease.  HGB stable.  f/u outpatient GI for repeat scope.

## 2018-10-25 NOTE — PROGRESS NOTE ADULT - PROBLEM SELECTOR PROBLEM 8
Lower extremity edema
Need for prophylactic measure
Need for prophylactic measure
TB (tuberculosis)
Need for prophylactic measure

## 2018-10-25 NOTE — DISCHARGE NOTE ADULT - MEDICATION SUMMARY - MEDICATIONS TO STOP TAKING
I will STOP taking the medications listed below when I get home from the hospital:    losartan 50 mg oral tablet  -- 1 tab(s) by mouth once a day  Note: on hold    amLODIPine 5 mg oral tablet  -- 1 tab(s) by mouth once a day  Note: on hold    metoclopramide 10 mg oral tablet  -- 1 tab(s) by mouth 3 times a day, As Needed

## 2018-10-25 NOTE — PROGRESS NOTE ADULT - PROBLEM SELECTOR PLAN 8
Check b/l duplex US given L>R edema and cancer hx  May also be related to poor nutritional status and amlodipine use
Continue on INH and B6  F/U CT chest to ensure no cavitating lesions given cough
Will cont Lovenox for DVT prophylaxis (medicine d/w Dr. Noyola).

## 2018-10-25 NOTE — DISCHARGE NOTE ADULT - PATIENT PORTAL LINK FT
You can access the [facility] Patient Portal, offered by [facility], by registering with the following website: [email or web address]

## 2018-10-25 NOTE — PHYSICAL THERAPY INITIAL EVALUATION ADULT - ADDITIONAL COMMENTS
Pt lives in a private home with daughter and family. Prior to admission pt was independent with all functional mobility and did not use an AD. Pt has full flight of stairs to second floor bedroom.

## 2018-10-25 NOTE — DISCHARGE NOTE ADULT - MEDICATION SUMMARY - MEDICATIONS TO TAKE
I will START or STAY ON the medications listed below when I get home from the hospital:    acetaminophen 325 mg oral tablet  -- 1 to 2 tab(s) by mouth every 6 hours, As Needed  -- Indication: For pain or fever management    ondansetron 8 mg oral tablet  -- 1 tab(s) by mouth 3 times a day, As Needed  -- Indication: For Nausea and vomiting, intractability of vomiting not specified, unspecified vomiting type    allopurinol 300 mg oral tablet  -- 1 tab(s) by mouth once a day  -- Indication: For Gout    loratadine 10 mg oral tablet  -- 1 tab(s) by mouth once a day  -- Indication: For Cough    isoniazid 300 mg oral tablet  -- 1 tab(s) by mouth once a day  -- Indication: For TB (tuberculosis)    benzonatate 100 mg oral capsule  -- 1 cap(s) by mouth every 8 hours  -- Indication: For Cough    albuterol 90 mcg/inh inhalation aerosol  -- 2 puff(s) inhaled every 6 hours, As needed, Shortness of Breath  -- Indication: For Cough    guaiFENesin 600 mg oral tablet, extended release  -- 1 tab(s) by mouth every 12 hours  -- Indication: For Cough    Combigan 0.2%-0.5% ophthalmic solution  -- 1 drop(s) in each eye 2 times a day  -- Indication: For eye drop     Protonix 40 mg oral delayed release tablet  -- 1 tab(s) by mouth once a day   -- It is very important that you take or use this exactly as directed.  Do not skip doses or discontinue unless directed by your doctor.  Obtain medical advice before taking any non-prescription drugs as some may affect the action of this medication.  Swallow whole.  Do not crush.    -- Indication: For Gastrointestinal hemorrhage with melena    Vitamin B6 50 mg oral tablet  -- 1 tab(s) by mouth once a day  -- Indication: For Supplement

## 2018-10-25 NOTE — PHYSICAL THERAPY INITIAL EVALUATION ADULT - PERTINENT HX OF CURRENT PROBLEM, REHAB EVAL
78M presenting s/p 2 falls yesterday with progressive weakness over the last week. Pt has had progressive weakness after recent chemotherapy. However, has also had severe persistent coughing that is nonproductive x 10 days.Pt also noticed that he has now been having dark melanotic stools including some episodes of watery tar-black diarrhea including 2 episodes today.

## 2018-10-25 NOTE — PROGRESS NOTE ADULT - ASSESSMENT
Mr. 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 Found to have anemia, SUJATHA, severe hypokalemia with QTc prolongation, monitoring on tele. Potassium rapidly normalized w/ repletion and hydration, SUJATHA resolved. Subjective improvement. Tele was only significant for intermittent runs of sinus tachycardia while patient was straining on the toilet or ambulating around the floor. HD improved w/ hydration though he'd continue to have episodes of intermittent hypotension, would improve w/ laying down. Not correlated w/ orthostatic symptoms, PT eval and recc home w/ home PT. GI eval w/ report of melena and anemia, no bleeding in house, will defer GI scope to outpatient. GI reviewed prior scope and noted H Pylori, I reached out to home GI doc who relayed that he treated Mr. Russo for his h pylori already. Ultimately patient's recent decompensation is temporally correlated w/ chemo. D/w patient and daughter, w/ help of cantonese  Fe, at length. They need to weigh risks/benefits of continuing going forward. Patient w/ chronic cough x 1 month, CT chest unrevealing other than RUL cyst: pulm not concerned for active TB. Treated empirically w/ anti-histamine and albuterol with improvement.

## 2018-10-25 NOTE — PROGRESS NOTE ADULT - ATTENDING COMMENTS
[facility] Patient seen and examined. Agree with above. The patient has not had any melena or hematochezia in the last 24 hours. He feels fine other than coughing which is non-productive. His reported melena is more likely due to gastric lymphoma seen on endoscopy/colonoscopy 1 month ago. However, differential also includes less likely PUD, erosive gastropathy, angioectasia. If the patient's H/H is stable, would defer endoscopic evaluation unless patient continues to have clinical signs of GI bleeding (melena or hematochezia). Workup of cough/weight loss/possible TB per primary team. Continue PPI.
d/w daughter at length.   Tx H Pylori after confirming this was found on scope.   dispo pending continued HD stability, speech/swallow eval, PT eval (likely to [facility]).
mild nonspecific leukocytosis on day of discharge, no fever or nonpositional tachycardia or clinical toxicity. No localizing symptoms or e/o infection. Asymptomatic and clinically well appearing, he has close f/u and is eager to go home, will d/c home w/ home PT and close f/u.   d/w home GI doc today.  d/w daughter at length.   42 minutes spent coordinating d/c, see d/c sum for details.
addendum: HGB stable this PM. Again c/w vasovagal this AM. Continue to monitor CBC QD.  dispo pending continued HD stability, speech/swallow eval, PT eval (likely to [facility]).

## 2018-10-25 NOTE — PROGRESS NOTE ADULT - SUBJECTIVE AND OBJECTIVE BOX
cc: cough, weakness.    Overnight w/ out acute events. HD stable afebrile, NSR 70-90, again w/ multiple runs of sinus tachycardia while exercising. This AM tele up to 140 though was walking with physical therapy, returned back to 70s-80s while in bed.   This AM feels well, asking to go home.  VSS, continues to have brown stool.     REVIEW OF SYSTEMS:  CONSTITUTIONAL: No fever, + weight loss, ++ fatigue  EYES: No eye pain, visual disturbances, or discharge  ENMT:  No difficulty hearing, tinnitus, vertigo; No sinus or throat pain  NECK: No pain or stiffness  RESPIRATORY: + cough, no wheezing, chills or hemoptysis; + shortness of breath w/ coughing fits.  CARDIOVASCULAR: No chest pain, palpitations, dizziness, or leg swelling  GASTROINTESTINAL: No abdominal or epigastric pain. No nausea, vomiting, or hematemesis; + diarrhea, no constipation. +?melena no hematochezia.  GENITOURINARY: No dysuria, frequency, hematuria, or incontinence  NEUROLOGICAL: No headaches, memory loss, + loss of strength, no numbness, or tremors  SKIN: No itching, burning, rashes, or lesions   ENDOCRINE: No heat or cold intolerance; No hair loss  MUSCULOSKELETAL: No joint pain or swelling; No muscle, back, or extremity pain  PSYCHIATRIC: No depression, anxiety, mood swings, or difficulty sleeping  HEME/LYMPH: No easy bruising, or bleeding gums    Vital Signs Last 24 Hrs  T(C): 36.8 (25 Oct 2018 13:38), Max: 36.9 (24 Oct 2018 20:13)  T(F): 98.3 (25 Oct 2018 13:38), Max: 98.4 (24 Oct 2018 20:13)  HR: 88 (25 Oct 2018 13:38) (79 - 113)  BP: 95/54 (25 Oct 2018 13:38) (95/54 - 132/69)  BP(mean): --  RR: 18 (25 Oct 2018 13:38) (18 - 18)  SpO2: 100% (25 Oct 2018 13:38) (96% - 100%)    PHYSICAL EXAM:  GENERAL: NAD, well-groomed, wearing glasses. Thin. Daughter by bedside. Nontoxic appearing, up in chair today.   HEAD:  Atraumatic, Normocephalic  EYES: EOMI, conjunctiva and sclera clear  NECK: Supple, No JVD  NERVOUS SYSTEM:  Alert & Oriented X3, Good concentration; Motor Strength 5/5 B/L upper and lower extremities  CHEST/LUNG: Clear to percussion bilaterally; No rales, rhonchi, wheezing. Cough.   HEART: Regular rate and rhythm; No murmurs  ABDOMEN: Soft, Nontender, Nondistended; Bowel sounds present  EXTREMITIES:  2+ Peripheral Pulses, No clubbing, cyanosis, or edema  SKIN: No rashes or lesions    Consultant(s) Notes Reviewed:  [x ] YES  [ ] NO    LABS:  10/25: hgb stable in 7/8 range, mild leukocytosis.   10/24: stable cbc/bmp, no leukocytosis.     no leukocytosis, stable hgb in 7 w/ microcytosis, stable plt.    BMP WNL, iron labs c/w ACD.   *Repeat CBC pending.                  7.8    7.72  )-----------( 334      ( 23 Oct 2018 08:07 )             24.3     10-23    138  |  101  |  7   ----------------------------<  120<H>  3.6   |  27  |  1.01    Ca    8.5      23 Oct 2018 06:41      CAPILLARY BLOOD GLUCOSE    RADIOLOGY & ADDITIONAL TESTS:  Imaging Personally Reviewed: yes   Care discussed w/ other providers: yes--  Consultant notes reviewed: yes    CT ch/ab/p:   CHEST:     LUNGS AND LARGE AIRWAYS: Patent central airways. No pulmonary nodules.   Small cystic area right upper lobe  PLEURA: No pleural effusion.  VESSELS: Within normal limits.  HEART: Heart size is normal. No pericardial effusion.  MEDIASTINUM AND FRANCESCO: No lymphadenopathy.  CHEST WALL AND LOWER NECK: Within normal limits.    ABDOMEN AND PELVIS:  LIVER: Within normal limits.  BILE DUCTS: Normal caliber.  GALLBLADDER: Within normal limits.  SPLEEN: Within normal limits.  PANCREAS: Within normal limits.  ADRENALS: Within normal limits.  KIDNEYS/URETERS: 4 cm right renal cyst. No hydronephrosis or calculi    BLADDER: Within normal limits.  REPRODUCTIVE ORGANS: Prostate is mildly enlarged measuring 4.0 x 4.9 cm    BOWEL: No definite gastric thickening to be seen. The remainder the bowel   is unremarkable         PERITONEUM: No ascites.  VESSELS:  Within normal limits.  RETROPERITONEUM: No lymphadenopathy.    ABDOMINAL WALL: Within normal limits.  BONES: Mild degenerative changes    IMPRESSION:     No acute findings identified.    No evidence of acute infiltrates.    Despite the history gastric lymphoma no definite focal gastric   abnormality can be seen nor is there definable adenopathy.    CTH WNL.     Duplex LE: no DVT.

## 2018-10-25 NOTE — DISCHARGE NOTE ADULT - PLAN OF CARE
Hemoglobin and hematocrit stable. no acute bleeding. GIB w/ hypotension.  Multiple episodes of melanotic diarrhea at home, brown stool here.   CT A/P with no concerning findings.  s/p 1L iv fluid. BP improved.  Continue with Protonix as directed.  Iron studies noted, most c/w anemia of chronic disease.  There are 2 common types of GI Bleed, Upper GI Bleed and Lower GI Bleed.  Upper GI Bleed affects the esophagus, stomach, and first part of the small intestine. Lower GI Bleed affects the colon and rectum.  Upper GI Bleed signs and symptoms to notify your Health Care Provider are vomiting blood, or coffee ground vomitus, and bowel movements that look like black tar.  Lower GI Bleed signs and symptoms to notify your health care provider are bright red bloody bowel movements.   Take your medications as prescribed by your Gastroenterologist.  If you have had an Endoscopy or Colonoscopy, follow up with your Gastroenterologist for Pathology results.  Avoid NSAIDs unless your Health Care Provider tells you that it is ok (Aspirin, Ibuprofen, Advil, Motrin, Aleve).  Follow up with your Gastroenterologist within 1-2 weeks of discharge. Avoid taking (NSAIDs) - (ex: Ibuprofen, Advil, Celebrex, Naprosyn)  Avoid taking any nephrotoxic agents (can harm kidneys) - Intravenous contrast for diagnostic testing, combination cold medications.  Have all medications adjusted for your renal function by your Health Care Provider.  Blood pressure control is important.  Take all medication as prescribed. LE dopplers- negative  stable. CT Chest with Small cystic area right upper lobe concern for reactivation of latent TB.   Continue with TB treatment. Continue Claritin as directed. resolved. Likely secondary to hypokalemia, resolved.   Repeat EKG from 10/22 NSR w/ QTc 481. Continue Claritin and robitussin as directed.

## 2018-10-25 NOTE — PHYSICAL THERAPY INITIAL EVALUATION ADULT - PRECAUTIONS/LIMITATIONS, REHAB EVAL
fall precautions/cardiac precautions/Is also very nauseous and vomiting up food he takes. Because of nausea he has had very poor po intake. Pt reports feeling very weak and also felt lightheaded after vomiting and then had a fall. He then had another episode of fall while walking in the bathroom. On second episode pt struck his head and was noted by his daughter to be mildly confused afterwards but this rapidly resolved. Pt did not lose consciousness with fall however. No chest pain. + lower extremity edema x 1 day with L>R, has not happened before. No calf tenderness or pain. No pleuritic chest pain.CXR(-) CTabdomen/pelvis (-) CTHead(-)

## 2018-10-25 NOTE — DISCHARGE NOTE ADULT - SECONDARY DIAGNOSIS.
SUJATHA (acute kidney injury) Lower extremity edema TB (tuberculosis) Cough Hypokalemia QT prolongation

## 2018-10-26 ENCOUNTER — EMERGENCY (EMERGENCY)
Facility: HOSPITAL | Age: 78
LOS: 1 days | Discharge: HOME | End: 2018-10-26
Attending: EMERGENCY MEDICINE
Payer: COMMERCIAL

## 2018-10-26 VITALS
TEMPERATURE: 99 F | WEIGHT: 108.03 LBS | DIASTOLIC BLOOD PRESSURE: 71 MMHG | HEART RATE: 91 BPM | HEIGHT: 66 IN | SYSTOLIC BLOOD PRESSURE: 128 MMHG | OXYGEN SATURATION: 97 % | RESPIRATION RATE: 17 BRPM

## 2018-10-26 LAB
ALBUMIN SERPL ELPH-MCNC: 3.2 G/DL — LOW (ref 3.3–5)
ALP SERPL-CCNC: 83 U/L — SIGNIFICANT CHANGE UP (ref 40–120)
ALT FLD-CCNC: 7 U/L — LOW (ref 10–45)
ANION GAP SERPL CALC-SCNC: 15 MMOL/L — SIGNIFICANT CHANGE UP (ref 5–17)
ANISOCYTOSIS BLD QL: SLIGHT — SIGNIFICANT CHANGE UP
APPEARANCE UR: CLEAR — SIGNIFICANT CHANGE UP
APTT BLD: 33.6 SEC — SIGNIFICANT CHANGE UP (ref 27.5–37.4)
AST SERPL-CCNC: 21 U/L — SIGNIFICANT CHANGE UP (ref 10–40)
BACTERIA # UR AUTO: NEGATIVE — SIGNIFICANT CHANGE UP
BASE EXCESS BLDV CALC-SCNC: 4.6 MMOL/L — HIGH (ref -2–2)
BASOPHILS # BLD AUTO: 0.1 K/UL — SIGNIFICANT CHANGE UP (ref 0–0.2)
BASOPHILS NFR BLD AUTO: 0.5 % — SIGNIFICANT CHANGE UP (ref 0–2)
BILIRUB SERPL-MCNC: 0.2 MG/DL — SIGNIFICANT CHANGE UP (ref 0.2–1.2)
BILIRUB UR-MCNC: NEGATIVE — SIGNIFICANT CHANGE UP
BUN SERPL-MCNC: 16 MG/DL — SIGNIFICANT CHANGE UP (ref 7–23)
CA-I SERPL-SCNC: 1.12 MMOL/L — SIGNIFICANT CHANGE UP (ref 1.12–1.3)
CALCIUM SERPL-MCNC: 8.7 MG/DL — SIGNIFICANT CHANGE UP (ref 8.4–10.5)
CHLORIDE BLDV-SCNC: 101 MMOL/L — SIGNIFICANT CHANGE UP (ref 96–108)
CHLORIDE SERPL-SCNC: 100 MMOL/L — SIGNIFICANT CHANGE UP (ref 96–108)
CO2 BLDV-SCNC: 31 MMOL/L — HIGH (ref 22–30)
CO2 SERPL-SCNC: 25 MMOL/L — SIGNIFICANT CHANGE UP (ref 22–31)
COLOR SPEC: SIGNIFICANT CHANGE UP
CREAT SERPL-MCNC: 1.18 MG/DL — SIGNIFICANT CHANGE UP (ref 0.5–1.3)
DIFF PNL FLD: NEGATIVE — SIGNIFICANT CHANGE UP
ELLIPTOCYTES BLD QL SMEAR: SLIGHT — SIGNIFICANT CHANGE UP
EOSINOPHIL # BLD AUTO: 0.1 K/UL — SIGNIFICANT CHANGE UP (ref 0–0.5)
EOSINOPHIL NFR BLD AUTO: 0.4 % — SIGNIFICANT CHANGE UP (ref 0–6)
EPI CELLS # UR: 1 /HPF — SIGNIFICANT CHANGE UP
GAS PNL BLDV: 136 MMOL/L — SIGNIFICANT CHANGE UP (ref 136–145)
GAS PNL BLDV: SIGNIFICANT CHANGE UP
GLUCOSE BLDV-MCNC: 113 MG/DL — HIGH (ref 70–99)
GLUCOSE SERPL-MCNC: 118 MG/DL — HIGH (ref 70–99)
GLUCOSE UR QL: NEGATIVE — SIGNIFICANT CHANGE UP
HCO3 BLDV-SCNC: 30 MMOL/L — HIGH (ref 21–29)
HCT VFR BLD CALC: 26 % — LOW (ref 39–50)
HCT VFR BLDA CALC: 25 % — LOW (ref 39–50)
HGB BLD CALC-MCNC: 8.1 G/DL — LOW (ref 13–17)
HGB BLD-MCNC: 8.4 G/DL — LOW (ref 13–17)
HYALINE CASTS # UR AUTO: 0 /LPF — SIGNIFICANT CHANGE UP (ref 0–2)
HYPOCHROMIA BLD QL: SLIGHT — SIGNIFICANT CHANGE UP
INR BLD: 1.08 RATIO — SIGNIFICANT CHANGE UP (ref 0.88–1.16)
KETONES UR-MCNC: ABNORMAL
LACTATE BLDV-MCNC: 1.6 MMOL/L — SIGNIFICANT CHANGE UP (ref 0.7–2)
LEUKOCYTE ESTERASE UR-ACNC: NEGATIVE — SIGNIFICANT CHANGE UP
LYMPHOCYTES # BLD AUTO: 1.1 K/UL — SIGNIFICANT CHANGE UP (ref 1–3.3)
LYMPHOCYTES # BLD AUTO: 9.2 % — LOW (ref 13–44)
MCHC RBC-ENTMCNC: 22.4 PG — LOW (ref 27–34)
MCHC RBC-ENTMCNC: 32.3 GM/DL — SIGNIFICANT CHANGE UP (ref 32–36)
MCV RBC AUTO: 69.3 FL — LOW (ref 80–100)
MICROCYTES BLD QL: SLIGHT — SIGNIFICANT CHANGE UP
MONOCYTES # BLD AUTO: 0.8 K/UL — SIGNIFICANT CHANGE UP (ref 0–0.9)
MONOCYTES NFR BLD AUTO: 6.7 % — SIGNIFICANT CHANGE UP (ref 2–14)
NEUTROPHILS # BLD AUTO: 10.3 K/UL — HIGH (ref 1.8–7.4)
NEUTROPHILS NFR BLD AUTO: 83.2 % — HIGH (ref 43–77)
NITRITE UR-MCNC: NEGATIVE — SIGNIFICANT CHANGE UP
PCO2 BLDV: 50 MMHG — SIGNIFICANT CHANGE UP (ref 35–50)
PH BLDV: 7.39 — SIGNIFICANT CHANGE UP (ref 7.35–7.45)
PH UR: 6.5 — SIGNIFICANT CHANGE UP (ref 5–8)
PLAT MORPH BLD: NORMAL — SIGNIFICANT CHANGE UP
PLATELET # BLD AUTO: 514 K/UL — HIGH (ref 150–400)
PO2 BLDV: 27 MMHG — SIGNIFICANT CHANGE UP (ref 25–45)
POIKILOCYTOSIS BLD QL AUTO: SLIGHT — SIGNIFICANT CHANGE UP
POTASSIUM BLDV-SCNC: 3.4 MMOL/L — LOW (ref 3.5–5.3)
POTASSIUM SERPL-MCNC: 3.7 MMOL/L — SIGNIFICANT CHANGE UP (ref 3.5–5.3)
POTASSIUM SERPL-SCNC: 3.7 MMOL/L — SIGNIFICANT CHANGE UP (ref 3.5–5.3)
PROT SERPL-MCNC: 6.6 G/DL — SIGNIFICANT CHANGE UP (ref 6–8.3)
PROT UR-MCNC: ABNORMAL
PROTHROM AB SERPL-ACNC: 11.8 SEC — SIGNIFICANT CHANGE UP (ref 9.8–12.7)
RBC # BLD: 3.75 M/UL — LOW (ref 4.2–5.8)
RBC # FLD: 16.9 % — HIGH (ref 10.3–14.5)
RBC BLD AUTO: ABNORMAL
RBC CASTS # UR COMP ASSIST: 1 /HPF — SIGNIFICANT CHANGE UP (ref 0–4)
SAO2 % BLDV: 34 % — LOW (ref 67–88)
SCHISTOCYTES BLD QL AUTO: SLIGHT — SIGNIFICANT CHANGE UP
SODIUM SERPL-SCNC: 140 MMOL/L — SIGNIFICANT CHANGE UP (ref 135–145)
SP GR SPEC: 1.02 — SIGNIFICANT CHANGE UP (ref 1.01–1.02)
TARGETS BLD QL SMEAR: SLIGHT — SIGNIFICANT CHANGE UP
UROBILINOGEN FLD QL: NEGATIVE — SIGNIFICANT CHANGE UP
WBC # BLD: 12.4 K/UL — HIGH (ref 3.8–10.5)
WBC # FLD AUTO: 12.4 K/UL — HIGH (ref 3.8–10.5)
WBC UR QL: 13 /HPF — HIGH (ref 0–5)

## 2018-10-26 PROCEDURE — 71046 X-RAY EXAM CHEST 2 VIEWS: CPT

## 2018-10-26 PROCEDURE — 82947 ASSAY GLUCOSE BLOOD QUANT: CPT

## 2018-10-26 PROCEDURE — 87086 URINE CULTURE/COLONY COUNT: CPT

## 2018-10-26 PROCEDURE — 85730 THROMBOPLASTIN TIME PARTIAL: CPT

## 2018-10-26 PROCEDURE — 93005 ELECTROCARDIOGRAM TRACING: CPT

## 2018-10-26 PROCEDURE — 87633 RESP VIRUS 12-25 TARGETS: CPT

## 2018-10-26 PROCEDURE — 93010 ELECTROCARDIOGRAM REPORT: CPT

## 2018-10-26 PROCEDURE — 87486 CHLMYD PNEUM DNA AMP PROBE: CPT

## 2018-10-26 PROCEDURE — 83605 ASSAY OF LACTIC ACID: CPT

## 2018-10-26 PROCEDURE — 84295 ASSAY OF SERUM SODIUM: CPT

## 2018-10-26 PROCEDURE — 85027 COMPLETE CBC AUTOMATED: CPT

## 2018-10-26 PROCEDURE — 71046 X-RAY EXAM CHEST 2 VIEWS: CPT | Mod: 26

## 2018-10-26 PROCEDURE — 82330 ASSAY OF CALCIUM: CPT

## 2018-10-26 PROCEDURE — 99284 EMERGENCY DEPT VISIT MOD MDM: CPT | Mod: 25

## 2018-10-26 PROCEDURE — 82435 ASSAY OF BLOOD CHLORIDE: CPT

## 2018-10-26 PROCEDURE — 87040 BLOOD CULTURE FOR BACTERIA: CPT

## 2018-10-26 PROCEDURE — 80053 COMPREHEN METABOLIC PANEL: CPT

## 2018-10-26 PROCEDURE — 87581 M.PNEUMON DNA AMP PROBE: CPT

## 2018-10-26 PROCEDURE — 96361 HYDRATE IV INFUSION ADD-ON: CPT

## 2018-10-26 PROCEDURE — 85610 PROTHROMBIN TIME: CPT

## 2018-10-26 PROCEDURE — 85014 HEMATOCRIT: CPT

## 2018-10-26 PROCEDURE — 82803 BLOOD GASES ANY COMBINATION: CPT

## 2018-10-26 PROCEDURE — 81001 URINALYSIS AUTO W/SCOPE: CPT

## 2018-10-26 PROCEDURE — 87798 DETECT AGENT NOS DNA AMP: CPT

## 2018-10-26 PROCEDURE — 84132 ASSAY OF SERUM POTASSIUM: CPT

## 2018-10-26 RX ORDER — AMLODIPINE BESYLATE 2.5 MG/1
1 TABLET ORAL
Qty: 0 | Refills: 0 | COMMUNITY

## 2018-10-26 RX ORDER — BRIMONIDINE TARTRATE, TIMOLOL MALEATE 2; 5 MG/ML; MG/ML
2 SOLUTION/ DROPS OPHTHALMIC
Qty: 0 | Refills: 0 | COMMUNITY

## 2018-10-26 RX ORDER — SODIUM CHLORIDE 9 MG/ML
500 INJECTION INTRAMUSCULAR; INTRAVENOUS; SUBCUTANEOUS ONCE
Qty: 0 | Refills: 0 | Status: COMPLETED | OUTPATIENT
Start: 2018-10-26 | End: 2018-10-26

## 2018-10-26 RX ORDER — METOCLOPRAMIDE HCL 10 MG
1 TABLET ORAL
Qty: 0 | Refills: 0 | COMMUNITY

## 2018-10-26 RX ORDER — LOSARTAN POTASSIUM 100 MG/1
1 TABLET, FILM COATED ORAL
Qty: 0 | Refills: 0 | COMMUNITY

## 2018-10-26 RX ORDER — POTASSIUM CHLORIDE 20 MEQ
20 PACKET (EA) ORAL ONCE
Qty: 0 | Refills: 0 | Status: COMPLETED | OUTPATIENT
Start: 2018-10-26 | End: 2018-10-26

## 2018-10-26 RX ADMIN — Medication 20 MILLIEQUIVALENT(S): at 22:25

## 2018-10-26 RX ADMIN — SODIUM CHLORIDE 500 MILLILITER(S): 9 INJECTION INTRAMUSCULAR; INTRAVENOUS; SUBCUTANEOUS at 23:24

## 2018-10-26 NOTE — ED PROVIDER NOTE - PROGRESS NOTE DETAILS
attending Fabiola: lengthy disucssion with patient and son and daughter at bedside. Pt now feels well. No true fever with 2 rectal temperatures. RVP still pending. Pt and family want to go home. Pt ate meal in ED. Will draw cultures, dc with copy of results, instruction for close outpatient follow-up and strict return precautions. attending Fabiola: lengthy disucssion with patient and son and daughter at bedside. Pt now feels well. No true fever with 2 rectal temperatures. RVP still pending. Offered admission but patient and family decline. Wish to go home. Understand risks of leaving. Pt ate meal in ED. Will draw cultures, dc with copy of results, instruction for close outpatient follow-up and strict return precautions.

## 2018-10-26 NOTE — ED PROVIDER NOTE - OBJECTIVE STATEMENT
78 M w GI bleed, Gastric Lymphoma on Rchop discharged from the hospital yesterday and was feeling much better last night. He is brought in by his daughter who reports that he became very weak today. He was sitting at the table today and felt very weak and dropped his drink, he also slumped down in his chair to the ground. His daughter helped him back into his chair and he slumped down again. The patient does not recall the event. She is concerned that he is weak and  possibly anemic. She explains that he has increased urinary frequency today.

## 2018-10-26 NOTE — ED PROVIDER NOTE - ATTENDING CONTRIBUTION TO CARE
attending Fabiola: 78yM h/o GIB, Gastric Lymphoma on chemo, recent hospital dc yesterday presents with family after episode of weakness today. Daughter concerned pt may be anemic. Also with increased urinary frequency today. On exam, well-appearing, MMM, pale conjunctiva, abdomen soft/NT, clear lungs, skin warm and dry. Will obtain rectal temp, FSG, EKG, labs, UA, cxr and reassess

## 2018-10-26 NOTE — ED ADULT TRIAGE NOTE - CHIEF COMPLAINT QUOTE
Weakness and confusing x 1 day. Pt recently discharged from [facility] 2 days ago

## 2018-10-27 VITALS
HEART RATE: 89 BPM | OXYGEN SATURATION: 97 % | SYSTOLIC BLOOD PRESSURE: 151 MMHG | DIASTOLIC BLOOD PRESSURE: 74 MMHG | TEMPERATURE: 99 F | RESPIRATION RATE: 18 BRPM

## 2018-10-27 LAB
CULTURE RESULTS: NO GROWTH — SIGNIFICANT CHANGE UP
RAPID RVP RESULT: SIGNIFICANT CHANGE UP
SPECIMEN SOURCE: SIGNIFICANT CHANGE UP

## 2018-10-27 RX ADMIN — SODIUM CHLORIDE 500 MILLILITER(S): 9 INJECTION INTRAMUSCULAR; INTRAVENOUS; SUBCUTANEOUS at 00:24

## 2018-10-27 NOTE — ED ADULT NURSE NOTE - OBJECTIVE STATEMENT
Patient presented to ED ambulatory w/ siblings c/o weakness at home, patient was sliding down from chair as per daughter statement. Patient was discharged yesterday from this hospital, admitted for a week. When in ED MW, patient awake, alert, ambulating well independently and moving easy on stretcher, smiling and interacting appropriated w/family and RN. Denies pain or any other discomfort. Voided in the bathroom. VVS.

## 2018-10-27 NOTE — ED ADULT NURSE NOTE - NSIMPLEMENTINTERV_GEN_ALL_ED
Implemented All Fall with Harm Risk Interventions:  [place] to call system. Call bell, personal items and telephone within reach. Instruct patient to call for assistance. Room bathroom lighting operational. Non-slip footwear when patient is off stretcher. Physically safe environment: no spills, clutter or unnecessary equipment. Stretcher in lowest position, wheels locked, appropriate side rails in place. Provide visual cue, wrist band, yellow gown, etc. Monitor gait and stability. Monitor for mental status changes and reorient to person, place, and time. Review medications for side effects contributing to fall risk. Reinforce activity limits and safety measures with patient and family. Provide visual clues: red socks.

## 2018-10-27 NOTE — ED ADULT NURSE NOTE - CHIEF COMPLAINT QUOTE
Weakness and confusing x 1 day. Pt recently discharged from [facility] 2 days ago

## 2018-11-01 LAB
CULTURE RESULTS: SIGNIFICANT CHANGE UP
CULTURE RESULTS: SIGNIFICANT CHANGE UP
SPECIMEN SOURCE: SIGNIFICANT CHANGE UP
SPECIMEN SOURCE: SIGNIFICANT CHANGE UP

## 2018-11-11 PROCEDURE — 70450 CT HEAD/BRAIN W/O DYE: CPT

## 2018-11-11 PROCEDURE — 87798 DETECT AGENT NOS DNA AMP: CPT

## 2018-11-11 PROCEDURE — 83550 IRON BINDING TEST: CPT

## 2018-11-11 PROCEDURE — 84132 ASSAY OF SERUM POTASSIUM: CPT

## 2018-11-11 PROCEDURE — 93970 EXTREMITY STUDY: CPT

## 2018-11-11 PROCEDURE — 82330 ASSAY OF CALCIUM: CPT

## 2018-11-11 PROCEDURE — 96374 THER/PROPH/DIAG INJ IV PUSH: CPT

## 2018-11-11 PROCEDURE — 82947 ASSAY GLUCOSE BLOOD QUANT: CPT

## 2018-11-11 PROCEDURE — 83605 ASSAY OF LACTIC ACID: CPT

## 2018-11-11 PROCEDURE — 92610 EVALUATE SWALLOWING FUNCTION: CPT

## 2018-11-11 PROCEDURE — 80048 BASIC METABOLIC PNL TOTAL CA: CPT

## 2018-11-11 PROCEDURE — 86901 BLOOD TYPING SEROLOGIC RH(D): CPT

## 2018-11-11 PROCEDURE — 87486 CHLMYD PNEUM DNA AMP PROBE: CPT

## 2018-11-11 PROCEDURE — 85027 COMPLETE CBC AUTOMATED: CPT

## 2018-11-11 PROCEDURE — 71250 CT THORAX DX C-: CPT

## 2018-11-11 PROCEDURE — 85014 HEMATOCRIT: CPT

## 2018-11-11 PROCEDURE — 83735 ASSAY OF MAGNESIUM: CPT

## 2018-11-11 PROCEDURE — 90686 IIV4 VACC NO PRSV 0.5 ML IM: CPT

## 2018-11-11 PROCEDURE — 82728 ASSAY OF FERRITIN: CPT

## 2018-11-11 PROCEDURE — 82746 ASSAY OF FOLIC ACID SERUM: CPT

## 2018-11-11 PROCEDURE — 86900 BLOOD TYPING SEROLOGIC ABO: CPT

## 2018-11-11 PROCEDURE — 93005 ELECTROCARDIOGRAM TRACING: CPT

## 2018-11-11 PROCEDURE — 84295 ASSAY OF SERUM SODIUM: CPT

## 2018-11-11 PROCEDURE — 74176 CT ABD & PELVIS W/O CONTRAST: CPT

## 2018-11-11 PROCEDURE — 80053 COMPREHEN METABOLIC PANEL: CPT

## 2018-11-11 PROCEDURE — 84100 ASSAY OF PHOSPHORUS: CPT

## 2018-11-11 PROCEDURE — 97161 PT EVAL LOW COMPLEX 20 MIN: CPT

## 2018-11-11 PROCEDURE — 86850 RBC ANTIBODY SCREEN: CPT

## 2018-11-11 PROCEDURE — 82607 VITAMIN B-12: CPT

## 2018-11-11 PROCEDURE — 82435 ASSAY OF BLOOD CHLORIDE: CPT

## 2018-11-11 PROCEDURE — 71046 X-RAY EXAM CHEST 2 VIEWS: CPT

## 2018-11-11 PROCEDURE — 87633 RESP VIRUS 12-25 TARGETS: CPT

## 2018-11-11 PROCEDURE — 87581 M.PNEUMON DNA AMP PROBE: CPT

## 2018-11-11 PROCEDURE — 81001 URINALYSIS AUTO W/SCOPE: CPT

## 2018-11-11 PROCEDURE — 99285 EMERGENCY DEPT VISIT HI MDM: CPT | Mod: 25

## 2018-11-11 PROCEDURE — 82803 BLOOD GASES ANY COMBINATION: CPT

## 2018-11-28 PROBLEM — A15.9 RESPIRATORY TUBERCULOSIS UNSPECIFIED: Chronic | Status: ACTIVE | Noted: 2018-10-20

## 2018-11-28 PROBLEM — M10.9 GOUT, UNSPECIFIED: Chronic | Status: ACTIVE | Noted: 2018-10-20

## 2018-11-28 PROBLEM — I10 ESSENTIAL (PRIMARY) HYPERTENSION: Chronic | Status: ACTIVE | Noted: 2018-10-20

## 2018-11-28 PROBLEM — C85.93 NON-HODGKIN LYMPHOMA, UNSPECIFIED, INTRA-ABDOMINAL LYMPH NODES: Chronic | Status: ACTIVE | Noted: 2018-10-19

## 2018-12-11 ENCOUNTER — APPOINTMENT (OUTPATIENT)
Dept: RADIATION ONCOLOGY | Facility: CLINIC | Age: 78
End: 2018-12-11
Payer: MEDICARE

## 2018-12-11 VITALS
WEIGHT: 113.65 LBS | RESPIRATION RATE: 16 BRPM | DIASTOLIC BLOOD PRESSURE: 76 MMHG | HEIGHT: 65 IN | HEART RATE: 75 BPM | OXYGEN SATURATION: 100 % | BODY MASS INDEX: 18.93 KG/M2 | SYSTOLIC BLOOD PRESSURE: 134 MMHG

## 2018-12-11 DIAGNOSIS — I10 ESSENTIAL (PRIMARY) HYPERTENSION: ICD-10-CM

## 2018-12-11 DIAGNOSIS — Z78.9 OTHER SPECIFIED HEALTH STATUS: ICD-10-CM

## 2018-12-11 DIAGNOSIS — Z87.891 PERSONAL HISTORY OF NICOTINE DEPENDENCE: ICD-10-CM

## 2018-12-11 PROCEDURE — 99205 OFFICE O/P NEW HI 60 MIN: CPT | Mod: 25

## 2018-12-11 RX ORDER — ISONIAZID 300 MG/1
TABLET ORAL
Refills: 0 | Status: ACTIVE | COMMUNITY

## 2018-12-11 RX ORDER — PREDNISONE 10 MG
TABLET ORAL
Refills: 0 | Status: ACTIVE | COMMUNITY

## 2018-12-11 RX ORDER — AMLODIPINE BESYLATE 5 MG/1
TABLET ORAL
Refills: 0 | Status: ACTIVE | COMMUNITY

## 2018-12-11 RX ORDER — ONDANSETRON HYDROCHLORIDE 4 MG/1
4 TABLET, FILM COATED ORAL
Refills: 0 | Status: ACTIVE | COMMUNITY

## 2018-12-11 RX ORDER — FOLIC ACID 1 MG/1
1 TABLET ORAL
Refills: 0 | Status: ACTIVE | COMMUNITY

## 2018-12-11 RX ORDER — PANTOPRAZOLE SODIUM 40 MG/1
40 GRANULE, DELAYED RELEASE ORAL
Refills: 0 | Status: ACTIVE | COMMUNITY

## 2018-12-11 RX ORDER — METOCLOPRAMIDE 10 MG/1
10 TABLET ORAL
Refills: 0 | Status: ACTIVE | COMMUNITY

## 2018-12-11 RX ORDER — ACETAMINOPHEN 500 MG/1
TABLET ORAL
Refills: 0 | Status: ACTIVE | COMMUNITY

## 2018-12-11 RX ORDER — CHLORHEXIDINE GLUCONATE 4 %
325 (65 FE) LIQUID (ML) TOPICAL
Refills: 0 | Status: ACTIVE | COMMUNITY

## 2018-12-11 NOTE — VITALS
[Maximal Pain Intensity: 0/10] : 0/10 [Least Pain Intensity: 0/10] : 0/10 [70: Cares for self; unalbe to carry on normal activity or do active work.] : 70: Cares for self; unable to carry on normal activity or do active work. [ECOG Performance Status: 1 - Restricted in physically strenuous activity but ambulatory and able to carry out work of a light or sedentary nature] : Performance Status: 1 - Restricted in physically strenuous activity but ambulatory and able to carry out work of a light or sedentary nature, e.g., light house work, office work

## 2018-12-11 NOTE — REVIEW OF SYSTEMS
[Fatigue] : fatigue [Negative] : Heme/Lymph [Constipation: Grade 0] : Constipation: Grade 0 [Diarrhea: Grade 0] : Diarrhea: Grade 0 [Dysphagia: Grade 0] : Dysphagia: Grade 0 [Nausea: Grade 0] : Nausea: Grade 0 [Vomiting: Grade 0] : Vomiting: Grade 0 [Fatigue: Grade 1 - Fatigue relieved by rest] : Fatigue: Grade 1 - Fatigue relieved by rest [Hematuria: Grade 0] : Hematuria: Grade 0

## 2018-12-11 NOTE — REASON FOR VISIT
[Consideration of Curative Therapy] : consideration of curative therapy for [Other: ___] : [unfilled] [Family Member] : family member

## 2018-12-28 NOTE — HISTORY OF PRESENT ILLNESS
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 pt presented to gastroenterologist  in September with abdominal pains,early satiety and weight loss since 7/18. 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  PET showed uptake in this area and no evidence of distant disease. \par \par He was then started on R-mini-CHOP chemotherapy with Dr. Christian.   has had 3 cycles of chemo with  and tolerating it well.  No complaints of pain at this time. Denies nausea and vomiting.  Eating well-occ constipation noted but pt is on Iron pills.\par pt speaks mainly cantonese with some english-daughter here to translate.

## 2018-12-28 NOTE — LETTER CLOSING
[Consult Closing:] : Thank you for allowing me to participate in the care of this patient.  If you have any questions, please do not hesitate to contact me. [Sincerely yours,] : Sincerely yours, [FreeTextEntry3] : Kali Lee MD\par  and Residency \par Department of Radiation Medicine\par [facility]\par [facility]\par [address] [place] [address]\[place] [address]\par Tel: [phone]\par Fax: [phone]\par \par \par

## 2019-01-01 NOTE — ED ADULT TRIAGE NOTE - BP NONINVASIVE SYSTOLIC (MM HG)
[de-identified] : Gregg 3/12 that revealed spindle/sclerosing VERO, mediport placed 3/15, and initiated treatment according to protocol [id number] on 3/15 with VAc/Vi. Due to age will give VAc first and then follow with VI. .\par \par Diagnosis: VERO\par Study: [id number]\3/12, broviac 3/15, circumcision 3/29\par hospitalizations: 3/7-3/29 for workup and initiation of treatment\par 4/2-4/3 cycle 3 VAC, blood transfusion\par 4/23-4/24 cycle 5 VAC per protocol, (cycle 3 for Gregg), blood transfusion\par 5/13-5/14 128

## 2019-01-14 ENCOUNTER — RESULT REVIEW (OUTPATIENT)
Age: 79
End: 2019-01-14

## 2019-02-01 ENCOUNTER — APPOINTMENT (OUTPATIENT)
Dept: RADIATION ONCOLOGY | Facility: CLINIC | Age: 79
End: 2019-02-01
Payer: MEDICARE

## 2019-02-01 VITALS
OXYGEN SATURATION: 100 % | DIASTOLIC BLOOD PRESSURE: 92 MMHG | BODY MASS INDEX: 19.06 KG/M2 | WEIGHT: 114.53 LBS | TEMPERATURE: 97.88 F | SYSTOLIC BLOOD PRESSURE: 159 MMHG | RESPIRATION RATE: 15 BRPM

## 2019-02-01 PROCEDURE — 99214 OFFICE O/P EST MOD 30 MIN: CPT | Mod: 25

## 2019-02-01 NOTE — REVIEW OF SYSTEMS
[Negative] : Allergic/Immunologic [Constipation: Grade 0] : Constipation: Grade 0 [Diarrhea: Grade 0] : Diarrhea: Grade 0 [Fatigue: Grade 1 - Fatigue relieved by rest] : Fatigue: Grade 1 - Fatigue relieved by rest [Hematuria: Grade 0] : Hematuria: Grade 0 [Cough: Grade 0] : Cough: Grade 0 [Dyspnea: Grade 0] : Dyspnea: Grade 0

## 2019-02-14 DIAGNOSIS — R94.8 ABNORMAL RESULTS OF FUNCTION STUDIES OF OTHER ORGANS AND SYSTEMS: ICD-10-CM

## 2019-02-20 ENCOUNTER — APPOINTMENT (OUTPATIENT)
Dept: GASTROENTEROLOGY | Facility: HOSPITAL | Age: 79
End: 2019-02-20

## 2019-02-20 ENCOUNTER — RESULT REVIEW (OUTPATIENT)
Age: 79
End: 2019-02-20

## 2019-02-20 ENCOUNTER — OUTPATIENT (OUTPATIENT)
Dept: OUTPATIENT SERVICES | Facility: HOSPITAL | Age: 79
LOS: 1 days | End: 2019-02-20
Payer: COMMERCIAL

## 2019-02-20 DIAGNOSIS — C85.93 NON-HODGKIN LYMPHOMA, UNSPECIFIED, INTRA-ABDOMINAL LYMPH NODES: ICD-10-CM

## 2019-02-20 DIAGNOSIS — R94.8 ABNORMAL RESULTS OF FUNCTION STUDIES OF OTHER ORGANS AND SYSTEMS: ICD-10-CM

## 2019-02-20 PROCEDURE — 88342 IMHCHEM/IMCYTCHM 1ST ANTB: CPT

## 2019-02-20 PROCEDURE — 88305 TISSUE EXAM BY PATHOLOGIST: CPT | Mod: 26

## 2019-02-20 PROCEDURE — 43239 EGD BIOPSY SINGLE/MULTIPLE: CPT | Mod: GC,59

## 2019-02-20 PROCEDURE — 88312 SPECIAL STAINS GROUP 1: CPT | Mod: 26

## 2019-02-20 PROCEDURE — 43259 EGD US EXAM DUODENUM/JEJUNUM: CPT | Mod: GC

## 2019-02-20 PROCEDURE — 88312 SPECIAL STAINS GROUP 1: CPT

## 2019-02-20 PROCEDURE — 43239 EGD BIOPSY SINGLE/MULTIPLE: CPT

## 2019-02-20 PROCEDURE — 88341 IMHCHEM/IMCYTCHM EA ADD ANTB: CPT

## 2019-02-20 PROCEDURE — 88305 TISSUE EXAM BY PATHOLOGIST: CPT

## 2019-02-20 PROCEDURE — 88342 IMHCHEM/IMCYTCHM 1ST ANTB: CPT | Mod: 26

## 2019-02-20 PROCEDURE — 88341 IMHCHEM/IMCYTCHM EA ADD ANTB: CPT | Mod: 26

## 2019-02-20 NOTE — PRE-ANESTHESIA EVALUATION ADULT - NSANTHOSAYNRD_GEN_A_CORE
No. ONEIL screening performed.  STOP BANG Legend: 0-2 = LOW Risk; 3-4 = INTERMEDIATE Risk; 5-8 = HIGH Risk

## 2019-02-25 LAB — SURGICAL PATHOLOGY STUDY: SIGNIFICANT CHANGE UP

## 2019-02-26 ENCOUNTER — CHART COPY (OUTPATIENT)
Age: 79
End: 2019-02-26

## 2019-02-26 RX ORDER — PANTOPRAZOLE 40 MG/1
40 TABLET, DELAYED RELEASE ORAL TWICE DAILY
Qty: 60 | Refills: 5 | Status: ACTIVE | COMMUNITY
Start: 2019-02-26 | End: 1900-01-01

## 2019-02-28 PROCEDURE — 77263 THER RADIOLOGY TX PLNG CPLX: CPT

## 2019-03-06 PROCEDURE — 77334 RADIATION TREATMENT AID(S): CPT | Mod: 26

## 2019-03-06 PROCEDURE — 77290 THER RAD SIMULAJ FIELD CPLX: CPT | Mod: 26

## 2019-03-19 PROCEDURE — 77300 RADIATION THERAPY DOSE PLAN: CPT | Mod: 26

## 2019-03-19 PROCEDURE — 77295 3-D RADIOTHERAPY PLAN: CPT | Mod: 26

## 2019-03-19 PROCEDURE — 77334 RADIATION TREATMENT AID(S): CPT | Mod: 26

## 2019-03-20 PROCEDURE — 77280 THER RAD SIMULAJ FIELD SMPL: CPT | Mod: 26

## 2019-03-21 VITALS
WEIGHT: 121.03 LBS | RESPIRATION RATE: 15 BRPM | BODY MASS INDEX: 20.14 KG/M2 | SYSTOLIC BLOOD PRESSURE: 138 MMHG | OXYGEN SATURATION: 100 % | HEART RATE: 70 BPM | DIASTOLIC BLOOD PRESSURE: 85 MMHG

## 2019-03-21 PROCEDURE — 77014: CPT | Mod: 26

## 2019-03-21 PROCEDURE — G6002: CPT | Mod: 26

## 2019-03-21 NOTE — VITALS
[Least Pain Intensity: 0/10] : 0/10 [Maximal Pain Intensity: 0/10] : 0/10 [ECOG Performance Status: 0 - Fully active, able to carry on all pre-disease performance without restriction] : Performance Status: 0 - Fully active, able to carry on all pre-disease performance without restriction [80: Normal activity with effort; some signs or symptoms of disease.] : 80: Normal activity with effort; some signs or symptoms of disease.

## 2019-03-21 NOTE — REVIEW OF SYSTEMS
[Constipation: Grade 0] : Constipation: Grade 0 [Diarrhea: Grade 0] : Diarrhea: Grade 0 [Nausea: Grade 0] : Nausea: Grade 0 [Vomiting: Grade 0] : Vomiting: Grade 0 [Fatigue: Grade 1 - Fatigue relieved by rest] : Fatigue: Grade 1 - Fatigue relieved by rest [Hematuria: Grade 0] : Hematuria: Grade 0 [Cough: Grade 0] : Cough: Grade 0 [Dyspnea: Grade 0] : Dyspnea: Grade 0 [Dermatitis Radiation: Grade 0] : Dermatitis Radiation: Grade 0 [Negative] : Heme/Lymph

## 2019-03-22 PROCEDURE — 77014: CPT | Mod: 26

## 2019-03-22 PROCEDURE — G6002: CPT | Mod: 26,59

## 2019-03-25 PROCEDURE — 77014: CPT | Mod: 26

## 2019-03-26 PROCEDURE — 77014: CPT | Mod: 26

## 2019-03-27 PROCEDURE — 77014: CPT | Mod: 26

## 2019-03-27 PROCEDURE — 77427 RADIATION TX MANAGEMENT X5: CPT

## 2019-03-28 ENCOUNTER — RX RENEWAL (OUTPATIENT)
Age: 79
End: 2019-03-28

## 2019-03-28 VITALS
OXYGEN SATURATION: 100 % | BODY MASS INDEX: 19.79 KG/M2 | DIASTOLIC BLOOD PRESSURE: 70 MMHG | HEART RATE: 77 BPM | RESPIRATION RATE: 18 BRPM | SYSTOLIC BLOOD PRESSURE: 122 MMHG | WEIGHT: 118.94 LBS

## 2019-03-28 PROCEDURE — 77014: CPT | Mod: 26

## 2019-03-28 RX ORDER — SUCRALFATE 1 G/1
1 TABLET ORAL
Qty: 60 | Refills: 0 | Status: ACTIVE | COMMUNITY
Start: 2019-02-26 | End: 1900-01-01

## 2019-03-28 NOTE — DISEASE MANAGEMENT
[Clinical] : TNM Stage: c [N/A] : Currently not applicable [TTNM] : x [NTNM] : x [MTNM] : x [de-identified] : 6 txtx/1080cGy [de-identified] : 28 tx/5040cGy [de-identified] : abdomen

## 2019-03-28 NOTE — REVIEW OF SYSTEMS
[Constipation: Grade 0] : Constipation: Grade 0 [Diarrhea: Grade 0] : Diarrhea: Grade 0 [Nausea: Grade 0] : Nausea: Grade 0 [Vomiting: Grade 0] : Vomiting: Grade 0 [Fatigue: Grade 1 - Fatigue relieved by rest] : Fatigue: Grade 1 - Fatigue relieved by rest [Hematuria: Grade 0] : Hematuria: Grade 0 [Cough: Grade 0] : Cough: Grade 0 [Dyspnea: Grade 0] : Dyspnea: Grade 0 [Dermatitis Radiation: Grade 0] : Dermatitis Radiation: Grade 0 [Negative] : Allergic/Immunologic

## 2019-03-28 NOTE — HISTORY OF PRESENT ILLNESS
[FreeTextEntry1] : 3/28/19\par -no complaints of pain noted\par -tolerating tx\par -bowel movements normal\par -denies nausea and vomiting\par -notices a lack of appetite, weight loss of 3 lb\par - passing a lot of gas after meals, using Gas-X\par \par \par 3/21/19\par -tolerated tx\par -no complaints of pain noted\par -eating well and moving bowels well\par \par

## 2019-03-28 NOTE — DISEASE MANAGEMENT
[Clinical] : TNM Stage: c [N/A] : Currently not applicable [TTNM] : x [NTNM] : x [MTNM] : x [de-identified] : 1 tx/180cGy [de-identified] : 28 tx/5040cGy [de-identified] : abdomen

## 2019-03-28 NOTE — HISTORY OF PRESENT ILLNESS
[FreeTextEntry1] : 3/21/19\par -tolerated tx\par -no complaints of pain noted\par -eating well and moving bowels well\par \par \par Pt is a 78 year old man recently diagnosed with gastric lymphoma, large B cell type.\par pt started treatment today

## 2019-03-29 PROCEDURE — 77014: CPT | Mod: 26

## 2019-04-01 PROCEDURE — 77014: CPT | Mod: 26

## 2019-04-02 ENCOUNTER — OTHER (OUTPATIENT)
Age: 79
End: 2019-04-02

## 2019-04-02 PROCEDURE — 77014: CPT | Mod: 26

## 2019-04-03 PROCEDURE — 77014: CPT | Mod: 26

## 2019-04-03 PROCEDURE — 77427 RADIATION TX MANAGEMENT X5: CPT

## 2019-04-04 ENCOUNTER — OTHER (OUTPATIENT)
Age: 79
End: 2019-04-04

## 2019-04-04 VITALS
WEIGHT: 118.17 LBS | DIASTOLIC BLOOD PRESSURE: 79 MMHG | HEART RATE: 68 BPM | RESPIRATION RATE: 16 BRPM | OXYGEN SATURATION: 100 % | SYSTOLIC BLOOD PRESSURE: 118 MMHG | BODY MASS INDEX: 19.66 KG/M2

## 2019-04-04 PROCEDURE — 77014: CPT | Mod: 26

## 2019-04-04 NOTE — REVIEW OF SYSTEMS
[Constipation: Grade 0] : Constipation: Grade 0 [Diarrhea: Grade 0] : Diarrhea: Grade 0 [Dysphagia: Grade 0] : Dysphagia: Grade 0 [Nausea: Grade 0] : Nausea: Grade 0 [Vomiting: Grade 0] : Vomiting: Grade 0 [Fatigue: Grade 1 - Fatigue relieved by rest] : Fatigue: Grade 1 - Fatigue relieved by rest [Hematuria: Grade 0] : Hematuria: Grade 0 [Cough: Grade 0] : Cough: Grade 0 [Dyspnea: Grade 0] : Dyspnea: Grade 0 [Dermatitis Radiation: Grade 0] : Dermatitis Radiation: Grade 0 [Negative] : Allergic/Immunologic

## 2019-04-05 ENCOUNTER — TRANSCRIPTION ENCOUNTER (OUTPATIENT)
Age: 79
End: 2019-04-05

## 2019-04-05 PROCEDURE — 77014: CPT | Mod: 26

## 2019-04-08 PROCEDURE — 77014: CPT | Mod: 26

## 2019-04-09 PROCEDURE — 77014: CPT | Mod: 26

## 2019-04-10 PROCEDURE — 77427 RADIATION TX MANAGEMENT X5: CPT

## 2019-04-10 PROCEDURE — 77014: CPT | Mod: 26

## 2019-04-10 NOTE — DISCUSSION/SUMMARY
[Cancer Type / Location / Histology Subtype: ________] : Cancer Type / Location / Histology Subtype: [unfilled] [Diagnosis Date (year): ____] : Diagnosis Date (year): [unfilled] [Radiation] : Radiation: Yes [Body Area Treated: _________] : Body Area Treated: [unfilled] [End Date (year): ____] : End Date (year): [unfilled] [Systemic Therapy (chemotherapy, hormonal therapy, other)] : Systemic Therapy (chemotherapy, hormonal therapy, other): Yes [Follow up with Oncologist in _____] : Follow up with Oncologist in [unfilled] [Follow up with Radiation MD in _____] : Follow up with Radiation MD in [unfilled] [Bloodwork: ______] : Bloodwork: [unfilled] [Scans: ______] : Scans: [unfilled] [Pain] : pain [Emotional and mental health] : Emotional and mental health [Fatigue] : Fatigue [Path to Wellness Survivorship Program] : [facility] to Well[facility] Survivorship [facility] [Cancer Care] : Cancer Care [American Cancer Society] : the American Cancer Society [FreeTextEntry8] : frederick Hewitt [FreeTextEntry9] : 4/12/19

## 2019-04-11 ENCOUNTER — OTHER (OUTPATIENT)
Age: 79
End: 2019-04-11

## 2019-04-11 VITALS
BODY MASS INDEX: 19.41 KG/M2 | DIASTOLIC BLOOD PRESSURE: 78 MMHG | RESPIRATION RATE: 16 BRPM | HEART RATE: 78 BPM | OXYGEN SATURATION: 100 % | WEIGHT: 116.62 LBS | SYSTOLIC BLOOD PRESSURE: 131 MMHG

## 2019-04-11 PROCEDURE — 77014: CPT | Mod: 26

## 2019-04-12 PROCEDURE — 77014: CPT | Mod: 26

## 2019-04-14 NOTE — HISTORY OF PRESENT ILLNESS
[FreeTextEntry1] : 4/4/19\par -tolerating tx well\par -stool 1-2 x day -soft but having a lot of gas-to try gas-x\par -eating well\par -no complaints of pain at this time\par \par \par 3/28/19\par -no complaints of pain noted\par -tolerating tx\par -bowel movements normal\par -denies nausea and vomiting\par -notices a lack of appetite, weight loss of 3 lb\par - passing a lot of gas after meals, using Gas-X\par \par \par 3/21/19\par -tolerated tx\par -no complaints of pain noted\par -eating well and moving bowels well\par \par

## 2019-04-14 NOTE — REVIEW OF SYSTEMS
[Constipation: Grade 0] : Constipation: Grade 0 [Diarrhea: Grade 0] : Diarrhea: Grade 0 [Dysphagia: Grade 0] : Dysphagia: Grade 0 [Nausea: Grade 0] : Nausea: Grade 0 [Vomiting: Grade 0] : Vomiting: Grade 0 [Fatigue: Grade 1 - Fatigue relieved by rest] : Fatigue: Grade 1 - Fatigue relieved by rest [Hematuria: Grade 0] : Hematuria: Grade 0 [Cough: Grade 1 - Mild symptoms; nonprescription intervention indicated] : Cough: Grade 1 - Mild symptoms; nonprescription intervention indicated [Dyspnea: Grade 0] : Dyspnea: Grade 0 [Dermatitis Radiation: Grade 0] : Dermatitis Radiation: Grade 0 [Negative] : Allergic/Immunologic [FreeTextEntry1] : dry

## 2019-04-14 NOTE — DISEASE MANAGEMENT
[Clinical] : TNM Stage: c [FreeTextEntry4] : Stage IE [NTNM] : x [TTNM] : x [I] : I [MTNM] : x [de-identified] : abdomen [de-identified] : 11tx/1980cGy [de-identified] : 28 tx/5040cGy

## 2019-04-14 NOTE — DISEASE MANAGEMENT
[Clinical] : TNM Stage: c [FreeTextEntry4] : Stage IE [MTNM] : x [TTNM] : x [NTNM] : x [I] : I [de-identified] : 28 tx/5040cGy [de-identified] : 16 tx/2880cGy [de-identified] : abdomen

## 2019-04-14 NOTE — HISTORY OF PRESENT ILLNESS
[FreeTextEntry1] : 4/11/19:\par -tolerated tx well-will complete tomorrow\par -moving bowels 2x day\par -eating well\par -no complaints of pain noted\par -discharge instructions reviewed and given along with followup appt\par \par \par 4/4/19\par -tolerating tx well\par -stool 1-2 x day -soft but having a lot of gas-to try gas-x\par -eating well\par -no complaints of pain at this time\par \par \par 3/28/19\par -no complaints of pain noted\par -tolerating tx\par -bowel movements normal\par -denies nausea and vomiting\par -notices a lack of appetite, weight loss of 3 lb\par - passing a lot of gas after meals, using Gas-X\par \par \par 3/21/19\par -tolerated tx\par -no complaints of pain noted\par -eating well and moving bowels well\par \par

## 2019-04-15 NOTE — HISTORY OF PRESENT ILLNESS
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 pt presented to gastroenterologist in September with abdominal pains,early satiety and weight loss since 7/18. 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 PET showed uptake in this area and no evidence of distant disease. \par \par He was then started on R-mini-CHOP chemotherapy with Dr. Christian and has since completed treatment.  He had a restaging PET/Ct performed this week and here today to discuss results and treatment plan.  PET/Ct shows persistent uptake in the lesser curvature, unclear if treatment related or persistent disease.\par \par No difficulty eating and denies abdominal pain at this time.Occ loose bowel movements noted. No diarrhea at this time.\par \par

## 2019-04-15 NOTE — PHYSICAL EXAM
[Sclera] : the sclera and conjunctiva were normal [Normal] : no respiratory distress, lungs were clear to auscultation bilaterally

## 2019-04-15 NOTE — DISEASE MANAGEMENT
[Clinical] : TNM Stage: c [TTNM] : - [FreeTextEntry4] : Stage IE [NTNM] : - [N/A] : Currently not applicable [MTNM] : -

## 2019-05-16 ENCOUNTER — APPOINTMENT (OUTPATIENT)
Dept: RADIATION ONCOLOGY | Facility: CLINIC | Age: 79
End: 2019-05-16
Payer: MEDICARE

## 2019-05-16 VITALS
OXYGEN SATURATION: 98 % | BODY MASS INDEX: 20.05 KG/M2 | WEIGHT: 120.48 LBS | SYSTOLIC BLOOD PRESSURE: 123 MMHG | HEART RATE: 71 BPM | DIASTOLIC BLOOD PRESSURE: 72 MMHG | RESPIRATION RATE: 16 BRPM

## 2019-05-16 PROCEDURE — 99024 POSTOP FOLLOW-UP VISIT: CPT | Mod: GC

## 2019-05-16 NOTE — PHYSICAL EXAM
[Exaggerated Use Of Accessory Muscles For Inspiration] : no accessory muscle use [] : no respiratory distress [Supraclavicular Lymph Nodes Enlarged Bilaterally] : supraclavicular [Normal] : oriented to person, place and time, the affect was normal, the mood was normal and not anxious [Abdomen Soft] : soft [Nondistended] : nondistended [Abdomen Tenderness] : non-tender

## 2019-06-27 ENCOUNTER — FORM ENCOUNTER (OUTPATIENT)
Age: 79
End: 2019-06-27

## 2019-06-28 ENCOUNTER — APPOINTMENT (OUTPATIENT)
Dept: NUCLEAR MEDICINE | Facility: IMAGING CENTER | Age: 79
End: 2019-06-28
Payer: MEDICARE

## 2019-06-28 ENCOUNTER — OUTPATIENT (OUTPATIENT)
Dept: OUTPATIENT SERVICES | Facility: HOSPITAL | Age: 79
LOS: 1 days | End: 2019-06-28
Payer: COMMERCIAL

## 2019-06-28 DIAGNOSIS — C85.93 NON-HODGKIN LYMPHOMA, UNSPECIFIED, INTRA-ABDOMINAL LYMPH NODES: ICD-10-CM

## 2019-06-28 PROCEDURE — 78815 PET IMAGE W/CT SKULL-THIGH: CPT | Mod: 26,PS

## 2019-06-28 PROCEDURE — A9552: CPT

## 2019-06-28 PROCEDURE — 78815 PET IMAGE W/CT SKULL-THIGH: CPT

## 2019-07-03 ENCOUNTER — APPOINTMENT (OUTPATIENT)
Dept: RADIATION ONCOLOGY | Facility: CLINIC | Age: 79
End: 2019-07-03
Payer: MEDICARE

## 2019-07-03 VITALS
RESPIRATION RATE: 18 BRPM | DIASTOLIC BLOOD PRESSURE: 68 MMHG | HEART RATE: 62 BPM | OXYGEN SATURATION: 99 % | WEIGHT: 122.13 LBS | SYSTOLIC BLOOD PRESSURE: 114 MMHG | BODY MASS INDEX: 20.32 KG/M2

## 2019-07-03 PROCEDURE — 99024 POSTOP FOLLOW-UP VISIT: CPT | Mod: GC

## 2019-07-23 DIAGNOSIS — K25.9 GASTRIC ULCER, UNSPECIFIED AS ACUTE OR CHRONIC, W/OUT HEMORRHAGE OR PERFORATION: ICD-10-CM

## 2019-08-15 NOTE — REVIEW OF SYSTEMS
[Negative] : Heme/Lymph [FreeTextEntry4] : dry mouth [de-identified] : fatigued [Dysphagia: Grade 0] : Dysphagia: Grade 0 [Esophagitis: Grade 0] : Esophagitis: Grade 0 [Nausea: Grade 0] : Nausea: Grade 0 [Vomiting: Grade 0] : Vomiting: Grade 0 [Dermatitis Radiation: Grade 0] : Dermatitis Radiation: Grade 0

## 2019-08-15 NOTE — REVIEW OF SYSTEMS
[Negative] : Heme/Lymph [Constipation: Grade 0] : Constipation: Grade 0 [Nausea: Grade 0] : Nausea: Grade 0 [Diarrhea: Grade 0] : Diarrhea: Grade 0 [Vomiting: Grade 0] : Vomiting: Grade 0 [Fatigue: Grade 1 - Fatigue relieved by rest] : Fatigue: Grade 1 - Fatigue relieved by rest [Gastroparesis: Grade 0] : Gastroparesis: Grade 0 [Dermatitis Radiation: Grade 0] : Dermatitis Radiation: Grade 0

## 2019-08-15 NOTE — HISTORY OF PRESENT ILLNESS
[FreeTextEntry1] : Steve Russo is a 78 year old treated to abdomen for gastric Lymphoma.  He received treatment from 3/21/19-4/12/19. His dosage was 3060cGy in 17 treatments, Tolerated treatment well without any unscheduled breaks.He returns today for PTE.\par \par No recent radiographics performed. Eating well with no changes in diet.  He has gained 4 pounds since last month. \par He intermittently has BMs that are a little looser, but this has been his baseline for the past few months. He denies N/V. He does feel fatigued and cold at times. No complaints of pain noted. He denies noticing any swelling. He denies SOB. He endorses a mild, intermittent dry cough. The patient has an appointment to see his medical oncologist, Dr. Christian, on 7/16/2019.

## 2019-08-15 NOTE — HISTORY OF PRESENT ILLNESS
[FreeTextEntry1] : Steve Russo is a 78 year old treated to abdomen for gastric Lymphoma.He received treatment from 3/21/19-4/12/19. His dosage was 3060cGy in 17 treatments, Tolerated treatment well without any unscheduled breaks.He returns today for follow-up\par \par Recent PET on 7/1/19 with no new findings and no evidence of recurrence.  Eating well with no changes in diet.  He has gained 2 pounds since last month. He intermittently has BMs that are a little looser, but this has been his baseline for the past few months. He denies N/V. He does feel fatigued and cold at times. No complaints of pain noted. He denies noticing any swelling. He denies SOB. He denies dysphagia and odynophagia. The patient has an appointment to see his medical oncologist, Dr. Christian, on 7/16/2019.

## 2019-08-15 NOTE — DISEASE MANAGEMENT
[Clinical] : TNM Stage: c [N/A] : Currently not applicable [FreeTextEntry4] : gastric lymphoma [TTNM] : - [NTNM] : - [MTNM] : -

## 2019-08-15 NOTE — DISEASE MANAGEMENT
[Clinical] : TNM Stage: c [N/A] : Currently not applicable [TTNM] : - [FreeTextEntry4] : gastric lymphoma [MTNM] : - [NTNM] : -

## 2019-12-10 ENCOUNTER — RESULT REVIEW (OUTPATIENT)
Age: 79
End: 2019-12-10

## 2019-12-10 ENCOUNTER — APPOINTMENT (OUTPATIENT)
Dept: GASTROENTEROLOGY | Facility: HOSPITAL | Age: 79
End: 2019-12-10

## 2019-12-10 ENCOUNTER — OUTPATIENT (OUTPATIENT)
Dept: OUTPATIENT SERVICES | Facility: HOSPITAL | Age: 79
LOS: 1 days | Discharge: HOME | End: 2019-12-10
Payer: MEDICARE

## 2019-12-10 VITALS
SYSTOLIC BLOOD PRESSURE: 120 MMHG | OXYGEN SATURATION: 100 % | HEART RATE: 55 BPM | RESPIRATION RATE: 12 BRPM | DIASTOLIC BLOOD PRESSURE: 67 MMHG

## 2019-12-10 VITALS
TEMPERATURE: 97 F | WEIGHT: 121.03 LBS | SYSTOLIC BLOOD PRESSURE: 133 MMHG | RESPIRATION RATE: 16 BRPM | OXYGEN SATURATION: 99 % | HEART RATE: 66 BPM | DIASTOLIC BLOOD PRESSURE: 73 MMHG | HEIGHT: 65 IN

## 2019-12-10 DIAGNOSIS — C85.93 NON-HODGKIN LYMPHOMA, UNSPECIFIED, INTRA-ABDOMINAL LYMPH NODES: ICD-10-CM

## 2019-12-10 DIAGNOSIS — Z92.3 PERSONAL HISTORY OF IRRADIATION: Chronic | ICD-10-CM

## 2019-12-10 DIAGNOSIS — Z98.890 OTHER SPECIFIED POSTPROCEDURAL STATES: Chronic | ICD-10-CM

## 2019-12-10 DIAGNOSIS — Z92.21 PERSONAL HISTORY OF ANTINEOPLASTIC CHEMOTHERAPY: Chronic | ICD-10-CM

## 2019-12-10 PROCEDURE — 88341 IMHCHEM/IMCYTCHM EA ADD ANTB: CPT | Mod: 26,59

## 2019-12-10 PROCEDURE — 88360 TUMOR IMMUNOHISTOCHEM/MANUAL: CPT | Mod: 26

## 2019-12-10 PROCEDURE — 43259 EGD US EXAM DUODENUM/JEJUNUM: CPT | Mod: GC

## 2019-12-10 PROCEDURE — 88305 TISSUE EXAM BY PATHOLOGIST: CPT | Mod: 26

## 2019-12-10 PROCEDURE — 88312 SPECIAL STAINS GROUP 1: CPT | Mod: 26

## 2019-12-10 PROCEDURE — 43239 EGD BIOPSY SINGLE/MULTIPLE: CPT | Mod: GC,59

## 2019-12-10 PROCEDURE — 88342 IMHCHEM/IMCYTCHM 1ST ANTB: CPT | Mod: 26,59

## 2019-12-10 RX ORDER — ALLOPURINOL 300 MG
1 TABLET ORAL
Qty: 0 | Refills: 0 | DISCHARGE

## 2019-12-10 RX ORDER — TAMSULOSIN HYDROCHLORIDE 0.4 MG/1
1 CAPSULE ORAL
Qty: 0 | Refills: 0 | DISCHARGE

## 2019-12-10 RX ORDER — AMLODIPINE BESYLATE 2.5 MG/1
1 TABLET ORAL
Qty: 0 | Refills: 0 | DISCHARGE

## 2019-12-10 RX ORDER — ACETAMINOPHEN 500 MG
1 TABLET ORAL
Qty: 0 | Refills: 0 | DISCHARGE

## 2019-12-10 RX ORDER — BRIMONIDINE TARTRATE, TIMOLOL MALEATE 2; 5 MG/ML; MG/ML
1 SOLUTION/ DROPS OPHTHALMIC
Qty: 0 | Refills: 0 | DISCHARGE

## 2019-12-10 RX ORDER — HEXAVITAMINS
1 TABLET ORAL
Qty: 0 | Refills: 0 | DISCHARGE

## 2019-12-10 RX ORDER — SODIUM CHLORIDE 9 MG/ML
1000 INJECTION, SOLUTION INTRAVENOUS
Refills: 0 | Status: DISCONTINUED | OUTPATIENT
Start: 2019-12-10 | End: 2020-01-05

## 2019-12-10 RX ORDER — PYRIDOXINE HCL (VITAMIN B6) 100 MG
1 TABLET ORAL
Qty: 0 | Refills: 0 | DISCHARGE

## 2019-12-10 RX ORDER — ONDANSETRON 8 MG/1
1 TABLET, FILM COATED ORAL
Qty: 0 | Refills: 0 | DISCHARGE

## 2019-12-10 RX ADMIN — SODIUM CHLORIDE 30 MILLILITER(S): 9 INJECTION, SOLUTION INTRAVENOUS at 10:01

## 2019-12-10 NOTE — ASU PREOP CHECKLIST - ORDERS/MEDICATION ADMINISTRATION RECORD ON CHART
Medicare Wellness Visit  Plan for Preventive Care    A good way for you to stay healthy is to use preventive care.  Medicare covers many services that can help you stay healthy.* The goal of these services is to find any health problems as quickly as possible. Finding problems early can help make them easier to treat.  Your personal plan below lists the services you may need and when they are due.     Health Maintenance Summary     Topic Due On Due Status Completed On    Immunization - Pneumococcal  Completed Nov 28, 2016    Medicare Wellness Visit Jun 1, 2018 Due On Jun 1, 2017    IMMUNIZATION - DTaP/Tdap/Td Sep 12, 2009 Overdue Sep 11, 2009    Immunization-Influenza  Completed Oct 9, 2017    Depression Screening Jun 6, 2019 Not Due Jun 6, 2018           Preventive Care for Women and Men    Heart Screenings (Cardiovascular):  · Blood tests are used to check your cholesterol, lipid and triglyceride levels. High levels can increase your risk for heart disease and stroke. High levels can be treated with medications, diet and exercise. Lowering your levels can help keep your heart and blood vessels healthy.  Your provider will order these tests if they are needed.    · An ultrasound is done to see if you have an abdominal aortic aneurysm (AAA).  This is an enlargement of one of the main blood vessels that delivers blood to the body.   In the [place], 9,000 deaths are caused by AAA.  You may not even know you have this problem and as many as 1 in 3 people will have a serious problem if it is not treated.  Early diagnosis allows for more effective treatment and cure.  If you have a family history of AAA or are a male age 65-75 who has smoked, you are at higher risk of an AAA.  Your provider can order this test, if needed.    Colorectal Screening:  · There are many tests that are used to check for cancer of your colon and rectum. You and your provider should discuss what test is best for you and when to have it  done.  Options include:  · Screening Colonoscopy: exam of the entire colon, seen through a flexible lighted tube.  · Flexible Sigmoidoscopy: exam of the last third (sigmoid portion) of the colon and rectum, seen through a flexible lighted tube.  · Cologuard DNA stool test: a sample of your stool is used to screen for cancer and unseen blood in your stool.  · Fecal Occult Blood Test: a sample of your stool is studied to find any unseen blood    Flu Shot:  · An immunization that helps to prevent influenza (the flu). You should get this every year. The best time to get the shot is in the fall.    Pneumococcal Shot:  • Vaccines are available that can help prevent pneumococcal disease, which is any type of infection caused by Streptococcus pneumoniae bacteria.   Their use can prevent some cases of pneumonia, meningitis, and sepsis. There are two types of pneumococcal vaccines:   o Conjugate vaccines (PCV-13 or Prevnar 13®) - helps protect against the 13 types of pneumococcal bacteria that are the most common causes of serious infections in children and adults.    o Polysaccharide vaccine (PPSV23 or [id number]®) - helps protect against 23 types of pneumococcal bacteria for patients who are recommended to get it.  These vaccines should be given at least 12 months apart.  A booster is usually not needed.     Hepatitis B Shot:  · An immunization that helps to protect people from getting Hepatitis B. Hepatitis B is a virus that spreads through contact with infected blood or body fluids. Many people with the virus do not have symptoms.  The virus can lead to serious problems, such as liver disease. Some people are at higher risk than others. Your doctor will tell you if you need this shot.     Diabetes Screening:  · A test to measure sugar (glucose) in your blood is called a fasting blood sugar. Fasting means you cannot have food or drink for at least 8 hours before the test. This test can detect diabetes long before you may  notice symptoms.    Glaucoma Screening:  · Glaucoma screening is performed by your eye doctor. The test measures the fluid pressure inside your eyes to determine if you have glaucoma.     Hepatitis C Screening:  · A blood test to see if you have the hepatitis C virus.  Hepatitis C attacks the liver and is a major cause of chronic liver disease.  Medicare will cover a single screening for all adults born between 1945 & 1965, or high risk patients (people who have injected illegal drugs or people who have had blood transfusions).  High risk patients who continue to inject illegal drugs can be screened for Hepatitis C every year.    Smoking and Tobacco-Use Cessation Counseling:  · Tobacco is the single greatest cause of disease and early death in our country today. Medication and counseling together can increase a person’s chance of quitting for good.   · Medicare covers two quitting attempts per year, with four counseling sessions per attempt (eight sessions in a 12 month period)    Preventive Screening tests for Women    Screening Mammograms and Breast Exams:  · An x-ray of your breasts to check for breast cancer before you or your doctor may be able to feel it.  If breast cancer is found early it can usually be treated with success.    Pelvic Exams and Pap Tests:  · An exam to check for cervical and vaginal cancer. A Pap test is a lab test in which cells are taken from your cervix and sent to the lab to look for signs of cervical cancer. If cancer of the cervix is found early, chances for a cure are good. Testing can generally end at age 65, or if a woman has a hysterectomy for a benign condition. Your provider may recommend more frequent testing if certain abnormal results are found.    Bone Mass Measurements:  · A painless x-ray of your bone density to see if you are at risk for a broken bone. Bone density refers to the thickness of bones or how tightly the bone tissue is packed.    Preventive Screening tests for  Men    Prostate Screening:  · PSA - Prostate Cancer blood test.  Experts do not recommend routine screening of healthy men with no signs or symptoms of prostate disease.  However, men should not ignore urinary symptoms, and should discuss their family history with their doctor.    *Medicare pays for many preventive services to keep you healthy. For some of these services, you might have to pay a deductible, coinsurance, and / or copayment.  The amounts vary depending on the type of services you need and the kind of Medicare health plan you have.               done

## 2019-12-10 NOTE — ASU PREOP CHECKLIST - 1.
Daughter - Marcela ash  will be driving home Daughter - Marcela ash  will be driving home([phone])

## 2019-12-10 NOTE — ASU PATIENT PROFILE, ADULT - PMH
BPH (benign prostatic hyperplasia)    Gastric lymphoma    Gout    HTN (hypertension)    TB (tuberculosis)

## 2019-12-23 ENCOUNTER — CHART COPY (OUTPATIENT)
Age: 79
End: 2019-12-23

## 2020-01-09 PROBLEM — N40.0 BENIGN PROSTATIC HYPERPLASIA WITHOUT LOWER URINARY TRACT SYMPTOMS: Chronic | Status: ACTIVE | Noted: 2019-12-10

## 2020-03-04 ENCOUNTER — APPOINTMENT (OUTPATIENT)
Dept: RADIATION ONCOLOGY | Facility: CLINIC | Age: 80
End: 2020-03-04
Payer: MEDICARE

## 2020-03-04 VITALS
TEMPERATURE: 98.06 F | SYSTOLIC BLOOD PRESSURE: 135 MMHG | HEIGHT: 65 IN | WEIGHT: 129.96 LBS | DIASTOLIC BLOOD PRESSURE: 76 MMHG | RESPIRATION RATE: 18 BRPM | OXYGEN SATURATION: 97 % | BODY MASS INDEX: 21.65 KG/M2 | HEART RATE: 78 BPM

## 2020-03-04 DIAGNOSIS — C85.99 NON-HODGKIN LYMPHOMA, UNSPECIFIED, EXTRANODAL AND SOLID ORGAN SITES: ICD-10-CM

## 2020-03-04 PROCEDURE — 99213 OFFICE O/P EST LOW 20 MIN: CPT

## 2020-03-04 NOTE — REVIEW OF SYSTEMS
[Fatigue] : fatigue [Nocturia] : nocturia [Negative] : Allergic/Immunologic [Dyspepsia: Grade 0] : Dyspepsia: Grade 0 [Dysphagia: Grade 0] : Dysphagia: Grade 0 [Nausea: Grade 0] : Nausea: Grade 0 [Vomiting: Grade 0] : Vomiting: Grade 0 [Fatigue: Grade 1 - Fatigue relieved by rest] : Fatigue: Grade 1 - Fatigue relieved by rest [Abdominal Pain] : no abdominal pain [Diarrhea] : no diarrhea [FreeTextEntry7] : loose/soft stools x 2 /day [FreeTextEntry8] : 4-5 times [Diarrhea: Grade 1 - Increase of <4 stools per day over baseline; mild increase in ostomy output compared to baseline] : Diarrhea: Grade 1 - Increase of <4 stools per day over baseline; mild increase in ostomy output compared to baseline [FreeTextEntry5] : "gassy"

## 2020-03-04 NOTE — PHYSICAL EXAM
[Nondistended] : nondistended [Normal] : normoactive bowel sounds, soft and nontender, no hepatosplenomegaly or masses appreciated

## 2020-03-04 NOTE — HISTORY OF PRESENT ILLNESS
[FreeTextEntry1] : Mr. Russo is a 78 year old male with extranodal gastric DLBC, stage 1E, s/p R-CHOP followed by EBRT from 3/21/19 -4/12/19 to total dose of 3060 cGy in 17 fractions. Tolerated treatment well without any unscheduled breaks.  He now returns for f/u.\par \par He had EGD done on 12/10/19 by Dr. Park noted  normal esophagus and gastritis, biopsy negative for H- pylori.   No malignancy.\par \par He returns today for follow-up. He reports fatigue,"gassy" after eating, soft stools x 2/day, usually after eating, fair appetite. weight noted to be stable. Denies pain, no N/V/D, no blood in stool.\par

## 2020-06-10 ENCOUNTER — OUTPATIENT (OUTPATIENT)
Dept: OUTPATIENT SERVICES | Facility: HOSPITAL | Age: 80
LOS: 1 days | End: 2020-06-10
Payer: COMMERCIAL

## 2020-06-10 ENCOUNTER — APPOINTMENT (OUTPATIENT)
Dept: NUCLEAR MEDICINE | Facility: IMAGING CENTER | Age: 80
End: 2020-06-10
Payer: MEDICARE

## 2020-06-10 DIAGNOSIS — Z92.21 PERSONAL HISTORY OF ANTINEOPLASTIC CHEMOTHERAPY: Chronic | ICD-10-CM

## 2020-06-10 DIAGNOSIS — C83.30 DIFFUSE LARGE B-CELL LYMPHOMA, UNSPECIFIED SITE: ICD-10-CM

## 2020-06-10 DIAGNOSIS — Z98.890 OTHER SPECIFIED POSTPROCEDURAL STATES: Chronic | ICD-10-CM

## 2020-06-10 DIAGNOSIS — Z92.3 PERSONAL HISTORY OF IRRADIATION: Chronic | ICD-10-CM

## 2020-06-10 DIAGNOSIS — Z00.8 ENCOUNTER FOR OTHER GENERAL EXAMINATION: ICD-10-CM

## 2020-06-10 PROCEDURE — 78815 PET IMAGE W/CT SKULL-THIGH: CPT | Mod: 26,PS

## 2020-06-10 PROCEDURE — A9552: CPT

## 2020-06-10 PROCEDURE — 78815 PET IMAGE W/CT SKULL-THIGH: CPT

## 2021-06-18 ENCOUNTER — APPOINTMENT (OUTPATIENT)
Dept: NUCLEAR MEDICINE | Facility: IMAGING CENTER | Age: 81
End: 2021-06-18
Payer: MEDICARE

## 2021-06-18 ENCOUNTER — OUTPATIENT (OUTPATIENT)
Dept: OUTPATIENT SERVICES | Facility: HOSPITAL | Age: 81
LOS: 1 days | End: 2021-06-18
Payer: COMMERCIAL

## 2021-06-18 DIAGNOSIS — C83.30 DIFFUSE LARGE B-CELL LYMPHOMA, UNSPECIFIED SITE: ICD-10-CM

## 2021-06-18 DIAGNOSIS — Z98.890 OTHER SPECIFIED POSTPROCEDURAL STATES: Chronic | ICD-10-CM

## 2021-06-18 DIAGNOSIS — Z92.21 PERSONAL HISTORY OF ANTINEOPLASTIC CHEMOTHERAPY: Chronic | ICD-10-CM

## 2021-06-18 DIAGNOSIS — Z92.3 PERSONAL HISTORY OF IRRADIATION: Chronic | ICD-10-CM

## 2021-06-18 PROCEDURE — A9552: CPT

## 2021-06-18 PROCEDURE — 78815 PET IMAGE W/CT SKULL-THIGH: CPT | Mod: 26,PS,MH

## 2021-06-18 PROCEDURE — 78815 PET IMAGE W/CT SKULL-THIGH: CPT

## 2022-02-08 NOTE — PROGRESS NOTE ADULT - PROBLEM/PLAN-4
Imtiaz Shah,    I just wanted to let you know that your lab results have been reviewed and are attached.    - Your lab results look stable; everything is normal.    Please let me know if you have any questions and have a great week!    Sincerely,    Tamika Valle PA-C    [facility]  [address] [place] [address]  Clinic Phone: [phone] DISPLAY PLAN FREE TEXT

## 2022-03-03 NOTE — H&P ADULT - PROBLEM SELECTOR PLAN 5
Yes Yes 2/2 vomiting and diarrhea  Replete as above  monitor on telemetry given QT prolongation Yes Yes Yes

## 2022-06-16 NOTE — VITALS
[Maximal Pain Intensity: 0/10] : 0/10 [NoTreatment Scheduled] : no treatment scheduled [Least Pain Intensity: 0/10] : 0/10 [80: Normal activity with effort; some signs or symptoms of disease.] : 80: Normal activity with effort; some signs or symptoms of disease.  done

## 2022-06-20 ENCOUNTER — APPOINTMENT (OUTPATIENT)
Dept: NUCLEAR MEDICINE | Facility: IMAGING CENTER | Age: 82
End: 2022-06-20
Payer: MEDICARE

## 2022-06-20 ENCOUNTER — OUTPATIENT (OUTPATIENT)
Dept: OUTPATIENT SERVICES | Facility: HOSPITAL | Age: 82
LOS: 1 days | End: 2022-06-20
Payer: COMMERCIAL

## 2022-06-20 DIAGNOSIS — Z98.890 OTHER SPECIFIED POSTPROCEDURAL STATES: Chronic | ICD-10-CM

## 2022-06-20 DIAGNOSIS — C83.30 DIFFUSE LARGE B-CELL LYMPHOMA, UNSPECIFIED SITE: ICD-10-CM

## 2022-06-20 DIAGNOSIS — Z92.21 PERSONAL HISTORY OF ANTINEOPLASTIC CHEMOTHERAPY: Chronic | ICD-10-CM

## 2022-06-20 DIAGNOSIS — Z92.3 PERSONAL HISTORY OF IRRADIATION: Chronic | ICD-10-CM

## 2022-06-20 PROCEDURE — 78815 PET IMAGE W/CT SKULL-THIGH: CPT | Mod: 26,PS

## 2022-06-20 PROCEDURE — A9552: CPT

## 2022-06-20 PROCEDURE — 78815 PET IMAGE W/CT SKULL-THIGH: CPT

## 2023-04-20 NOTE — PROGRESS NOTE ADULT - PROBLEM/PLAN-1
DISPLAY PLAN FREE TEXT
(3) occasionally moist

## 2023-07-19 ENCOUNTER — APPOINTMENT (OUTPATIENT)
Dept: NUCLEAR MEDICINE | Facility: IMAGING CENTER | Age: 83
End: 2023-07-19
Payer: MEDICARE

## 2023-07-19 ENCOUNTER — OUTPATIENT (OUTPATIENT)
Dept: OUTPATIENT SERVICES | Facility: HOSPITAL | Age: 83
LOS: 1 days | End: 2023-07-19
Payer: COMMERCIAL

## 2023-07-19 DIAGNOSIS — Z92.21 PERSONAL HISTORY OF ANTINEOPLASTIC CHEMOTHERAPY: Chronic | ICD-10-CM

## 2023-07-19 DIAGNOSIS — C83.30 DIFFUSE LARGE B-CELL LYMPHOMA, UNSPECIFIED SITE: ICD-10-CM

## 2023-07-19 DIAGNOSIS — Z92.3 PERSONAL HISTORY OF IRRADIATION: Chronic | ICD-10-CM

## 2023-07-19 DIAGNOSIS — Z98.890 OTHER SPECIFIED POSTPROCEDURAL STATES: Chronic | ICD-10-CM

## 2023-07-19 PROCEDURE — 78815 PET IMAGE W/CT SKULL-THIGH: CPT | Mod: 26,PS

## 2023-07-19 PROCEDURE — 78815 PET IMAGE W/CT SKULL-THIGH: CPT

## 2023-07-19 PROCEDURE — A9552: CPT

## 2023-12-04 NOTE — PATIENT PROFILE ADULT - LANGUAGE ASSISTANCE NEEDED
No-Patient/Caregiver offered and refused free interpretation services. pt forgot whether she took it or not/No

## 2024-12-17 NOTE — PROGRESS NOTE ADULT - PROBLEM SELECTOR PROBLEM 4
For salivary gland inflammation:    1. Drink plenty of fluids, water is best, avoid caffeine and soda or sugary drinks.      2. Place warm compresses over the area of swelling for 20 min or so several times daily when possible.      3. Every hour or two, use the pad of your index finger on the side of swelling to 'milk' the salivary duct from back to front in the floor of your mouth next to the tongue (inside the jawline) with firm pressure on the tissues. Use your other hand on the outside of the neck to press upward.  This may be somewhat tender at first, but will help the saliva start flowing.      4. Use ibuprofen as needed for aching/pain.      5. Suck on a lemon wedge every 4 hours or so, enough to make you 'pucker up', this will enhance the flow of saliva and help dislodge any blockage in the gland, speeding your recovery.       6. If the skin gets much more red, tense, and hot, increased pain, fevers, etc occur, then call for possible antibiotics.        Cough

## 2025-02-17 NOTE — PROGRESS NOTE ADULT - SUBJECTIVE AND OBJECTIVE BOX
David Ward MD  Division of Hospital Medicine  Pager [phone]      ORLIN BROWN  78y  Male      Patient is a 78y old  Male who presents with a chief complaint of Severe hypokalemia/Prolonged QTc interval/GI bleed w/melena/Nausea/vomiting/diarrhea/coughing/weakness and fall with head strike (21 Oct 2018 08:46)      INTERVAL HPI/OVERNIGHT EVENTS:  Seen with Son present. Still with nonproductive cough. Did not sleep well.       REVIEW OF SYSTEMS: 14 point ROS negative unless listed above    T(C): 36.7 (10-21-18 @ 12:28), Max: 37.2 (10-20-18 @ 21:04)  HR: 103 (10-21-18 @ 12:28) (83 - 103)  BP: 112/63 (10-21-18 @ 12:28) (112/63 - 140/61)  RR: 18 (10-21-18 @ 12:28) (18 - 18)  SpO2: 99% (10-21-18 @ 12:28) (96% - 100%)  Wt(kg): --Vital Signs Last 24 Hrs  T(C): 36.7 (21 Oct 2018 12:28), Max: 37.2 (20 Oct 2018 21:04)  T(F): 98 (21 Oct 2018 12:28), Max: 98.9 (20 Oct 2018 21:04)  HR: 103 (21 Oct 2018 12:28) (83 - 103)  BP: 112/63 (21 Oct 2018 12:28) (112/63 - 140/61)  BP(mean): --  RR: 18 (21 Oct 2018 12:28) (18 - 18)  SpO2: 99% (21 Oct 2018 12:28) (96% - 100%)    PHYSICAL EXAM:  GENERAL: NAD, well-groomed, well-developed  ENMT: No tonsillar erythema, exudates,; Moist mucous membranes. No lesions  NECK: Supple, No JVD  CHEST/LUNG: Clear to percussion bilaterally; No rales, rhonchi, wheezing, or rubs  HEART: Regular rate and rhythm; No murmurs, rubs, or gallops  ABDOMEN: Soft, Nontender, Nondistended; Bowel sounds present.   EXTREMITIES:  2+ Peripheral Pulses, No clubbing, cyanosis, or edema  SKIN: No rashes or lesions  PSYCH: Alert & Oriented x3    Consultant(s) Notes Reviewed:  [x ] YES  [ ] NO  Care Discussed with Consultants/Other Providers [ x] YES  [ ] NO    LABS:                        7.4    9.99  )-----------( 213      ( 21 Oct 2018 08:18 )             23.1     10-    142  |  103  |  6<L>  ----------------------------<  111<H>  3.8   |  27  |  0.88    Ca    7.7<L>      21 Oct 2018 06:51  Phos  1.5     10-20  Mg     1.8     10-20    TPro  5.9<L>  /  Alb  2.8<L>  /  TBili  0.4  /  DBili  x   /  AST  18  /  ALT  14  /  AlkPhos  73  10-      Urinalysis Basic - ( 19 Oct 2018 19:44 )    Color: Yellow / Appearance: Slightly Turbid / S.016 / pH: x  Gluc: x / Ketone: Small  / Bili: Negative / Urobili: Negative   Blood: x / Protein: 30 mg/dL / Nitrite: Negative   Leuk Esterase: Negative / RBC: 1 /hpf / WBC 8 /hpf   Sq Epi: x / Non Sq Epi: 4 /hpf / Bacteria: Negative      CAPILLARY BLOOD GLUCOSE            Urinalysis Basic - ( 19 Oct 2018 19:44 )    Color: Yellow / Appearance: Slightly Turbid / S.016 / pH: x  Gluc: x / Ketone: Small  / Bili: Negative / Urobili: Negative   Blood: x / Protein: 30 mg/dL / Nitrite: Negative   Leuk Esterase: Negative / RBC: 1 /hpf / WBC 8 /hpf   Sq Epi: x / Non Sq Epi: 4 /hpf / Bacteria: Negative        RADIOLOGY & ADDITIONAL TESTS:    Imaging Personally Reviewed:  [x ] YES  [ ] NO No